# Patient Record
Sex: FEMALE | Race: BLACK OR AFRICAN AMERICAN | NOT HISPANIC OR LATINO | URBAN - METROPOLITAN AREA
[De-identification: names, ages, dates, MRNs, and addresses within clinical notes are randomized per-mention and may not be internally consistent; named-entity substitution may affect disease eponyms.]

---

## 2017-01-10 ENCOUNTER — EMERGENCY (EMERGENCY)
Facility: HOSPITAL | Age: 47
LOS: 1 days | Discharge: HOME | End: 2017-01-10
Attending: EMERGENCY MEDICINE | Admitting: EMERGENCY MEDICINE
Payer: COMMERCIAL

## 2017-01-10 VITALS
DIASTOLIC BLOOD PRESSURE: 100 MMHG | TEMPERATURE: 98 F | OXYGEN SATURATION: 100 % | RESPIRATION RATE: 12 BRPM | SYSTOLIC BLOOD PRESSURE: 139 MMHG | HEART RATE: 132 BPM

## 2017-01-10 VITALS
TEMPERATURE: 98 F | HEART RATE: 89 BPM | RESPIRATION RATE: 17 BRPM | SYSTOLIC BLOOD PRESSURE: 157 MMHG | OXYGEN SATURATION: 98 % | DIASTOLIC BLOOD PRESSURE: 101 MMHG

## 2017-01-10 DIAGNOSIS — N93.9 ABNORMAL UTERINE AND VAGINAL BLEEDING, UNSPECIFIED: ICD-10-CM

## 2017-01-10 DIAGNOSIS — Z98.890 OTHER SPECIFIED POSTPROCEDURAL STATES: Chronic | ICD-10-CM

## 2017-01-10 LAB
ALBUMIN SERPL ELPH-MCNC: 3.9 G/DL — SIGNIFICANT CHANGE UP (ref 3.3–5)
ALP SERPL-CCNC: 68 U/L — SIGNIFICANT CHANGE UP (ref 40–120)
ALT FLD-CCNC: 8 U/L RC — LOW (ref 10–45)
ANION GAP SERPL CALC-SCNC: 13 MMOL/L — SIGNIFICANT CHANGE UP (ref 5–17)
APPEARANCE UR: ABNORMAL
APTT BLD: 25.6 SEC — LOW (ref 27.5–37.4)
AST SERPL-CCNC: 17 U/L — SIGNIFICANT CHANGE UP (ref 10–40)
BACTERIA # UR AUTO: NEGATIVE /HPF — SIGNIFICANT CHANGE UP
BASOPHILS # BLD AUTO: 0 K/UL — SIGNIFICANT CHANGE UP (ref 0–0.2)
BASOPHILS NFR BLD AUTO: 0.4 % — SIGNIFICANT CHANGE UP (ref 0–2)
BILIRUB SERPL-MCNC: 0.5 MG/DL — SIGNIFICANT CHANGE UP (ref 0.2–1.2)
BILIRUB UR-MCNC: NEGATIVE — SIGNIFICANT CHANGE UP
BLD GP AB SCN SERPL QL: NEGATIVE — SIGNIFICANT CHANGE UP
BUN SERPL-MCNC: 7 MG/DL — SIGNIFICANT CHANGE UP (ref 7–23)
CALCIUM SERPL-MCNC: 9.3 MG/DL — SIGNIFICANT CHANGE UP (ref 8.4–10.5)
CHLORIDE SERPL-SCNC: 102 MMOL/L — SIGNIFICANT CHANGE UP (ref 96–108)
CO2 SERPL-SCNC: 24 MMOL/L — SIGNIFICANT CHANGE UP (ref 22–31)
COLOR SPEC: ABNORMAL
CREAT SERPL-MCNC: 0.62 MG/DL — SIGNIFICANT CHANGE UP (ref 0.5–1.3)
DIFF PNL FLD: ABNORMAL
EOSINOPHIL # BLD AUTO: 0.1 K/UL — SIGNIFICANT CHANGE UP (ref 0–0.5)
EOSINOPHIL NFR BLD AUTO: 0.5 % — SIGNIFICANT CHANGE UP (ref 0–6)
EPI CELLS # UR: NEGATIVE /HPF — SIGNIFICANT CHANGE UP
GLUCOSE SERPL-MCNC: 98 MG/DL — SIGNIFICANT CHANGE UP (ref 70–99)
GLUCOSE UR QL: NEGATIVE — SIGNIFICANT CHANGE UP
HCG SERPL-ACNC: <2 MIU/ML — SIGNIFICANT CHANGE UP
HCT VFR BLD CALC: 31 % — LOW (ref 34.5–45)
HCT VFR BLD CALC: 33.7 % — LOW (ref 34.5–45)
HGB BLD-MCNC: 11.3 G/DL — LOW (ref 11.5–15.5)
HGB BLD-MCNC: 12.4 G/DL — SIGNIFICANT CHANGE UP (ref 11.5–15.5)
INR BLD: 1.11 RATIO — SIGNIFICANT CHANGE UP (ref 0.88–1.16)
KETONES UR-MCNC: ABNORMAL
LEUKOCYTE ESTERASE UR-ACNC: ABNORMAL
LIDOCAIN IGE QN: 40 U/L — SIGNIFICANT CHANGE UP (ref 7–60)
LYMPHOCYTES # BLD AUTO: 1.3 K/UL — SIGNIFICANT CHANGE UP (ref 1–3.3)
LYMPHOCYTES # BLD AUTO: 9.8 % — LOW (ref 13–44)
MCHC RBC-ENTMCNC: 30.7 PG — SIGNIFICANT CHANGE UP (ref 27–34)
MCHC RBC-ENTMCNC: 30.7 PG — SIGNIFICANT CHANGE UP (ref 27–34)
MCHC RBC-ENTMCNC: 36.5 GM/DL — HIGH (ref 32–36)
MCHC RBC-ENTMCNC: 36.6 GM/DL — HIGH (ref 32–36)
MCV RBC AUTO: 83.7 FL — SIGNIFICANT CHANGE UP (ref 80–100)
MCV RBC AUTO: 84.1 FL — SIGNIFICANT CHANGE UP (ref 80–100)
MONOCYTES # BLD AUTO: 0.8 K/UL — SIGNIFICANT CHANGE UP (ref 0–0.9)
MONOCYTES NFR BLD AUTO: 6 % — SIGNIFICANT CHANGE UP (ref 2–14)
NEUTROPHILS # BLD AUTO: 11.2 K/UL — HIGH (ref 1.8–7.4)
NEUTROPHILS NFR BLD AUTO: 83.4 % — HIGH (ref 43–77)
NITRITE UR-MCNC: NEGATIVE — SIGNIFICANT CHANGE UP
PH UR: 5.5 — SIGNIFICANT CHANGE UP (ref 4.8–8)
PLATELET # BLD AUTO: 126 K/UL — LOW (ref 150–400)
PLATELET # BLD AUTO: 140 K/UL — LOW (ref 150–400)
POTASSIUM SERPL-MCNC: 3.8 MMOL/L — SIGNIFICANT CHANGE UP (ref 3.5–5.3)
POTASSIUM SERPL-SCNC: 3.8 MMOL/L — SIGNIFICANT CHANGE UP (ref 3.5–5.3)
PROT SERPL-MCNC: 7.3 G/DL — SIGNIFICANT CHANGE UP (ref 6–8.3)
PROT UR-MCNC: 100 MG/DL
PROTHROM AB SERPL-ACNC: 12.1 SEC — SIGNIFICANT CHANGE UP (ref 10–13.1)
RBC # BLD: 3.68 M/UL — LOW (ref 3.8–5.2)
RBC # BLD: 4.03 M/UL — SIGNIFICANT CHANGE UP (ref 3.8–5.2)
RBC # FLD: 14.2 % — SIGNIFICANT CHANGE UP (ref 10.3–14.5)
RBC # FLD: 14.4 % — SIGNIFICANT CHANGE UP (ref 10.3–14.5)
RBC CASTS # UR COMP ASSIST: >50 /HPF (ref 0–2)
RH IG SCN BLD-IMP: POSITIVE — SIGNIFICANT CHANGE UP
SODIUM SERPL-SCNC: 139 MMOL/L — SIGNIFICANT CHANGE UP (ref 135–145)
SP GR SPEC: 1.02 — SIGNIFICANT CHANGE UP (ref 1.01–1.02)
UROBILINOGEN FLD QL: NEGATIVE — SIGNIFICANT CHANGE UP
WBC # BLD: 11.9 K/UL — HIGH (ref 3.8–10.5)
WBC # BLD: 13.4 K/UL — HIGH (ref 3.8–10.5)
WBC # FLD AUTO: 11.9 K/UL — HIGH (ref 3.8–10.5)
WBC # FLD AUTO: 13.4 K/UL — HIGH (ref 3.8–10.5)
WBC UR QL: SIGNIFICANT CHANGE UP /HPF (ref 0–5)

## 2017-01-10 PROCEDURE — 83690 ASSAY OF LIPASE: CPT

## 2017-01-10 PROCEDURE — 86900 BLOOD TYPING SEROLOGIC ABO: CPT

## 2017-01-10 PROCEDURE — 81001 URINALYSIS AUTO W/SCOPE: CPT

## 2017-01-10 PROCEDURE — 86901 BLOOD TYPING SEROLOGIC RH(D): CPT

## 2017-01-10 PROCEDURE — 93005 ELECTROCARDIOGRAM TRACING: CPT

## 2017-01-10 PROCEDURE — 93010 ELECTROCARDIOGRAM REPORT: CPT

## 2017-01-10 PROCEDURE — 99284 EMERGENCY DEPT VISIT MOD MDM: CPT | Mod: 25

## 2017-01-10 PROCEDURE — 85610 PROTHROMBIN TIME: CPT

## 2017-01-10 PROCEDURE — 84702 CHORIONIC GONADOTROPIN TEST: CPT

## 2017-01-10 PROCEDURE — 85730 THROMBOPLASTIN TIME PARTIAL: CPT

## 2017-01-10 PROCEDURE — 86850 RBC ANTIBODY SCREEN: CPT

## 2017-01-10 PROCEDURE — 80053 COMPREHEN METABOLIC PANEL: CPT

## 2017-01-10 PROCEDURE — 85027 COMPLETE CBC AUTOMATED: CPT

## 2017-01-10 RX ORDER — SODIUM CHLORIDE 9 MG/ML
1000 INJECTION INTRAMUSCULAR; INTRAVENOUS; SUBCUTANEOUS ONCE
Qty: 0 | Refills: 0 | Status: COMPLETED | OUTPATIENT
Start: 2017-01-10 | End: 2017-01-10

## 2017-01-10 RX ADMIN — SODIUM CHLORIDE 2000 MILLILITER(S): 9 INJECTION INTRAMUSCULAR; INTRAVENOUS; SUBCUTANEOUS at 11:58

## 2017-01-10 RX ADMIN — SODIUM CHLORIDE 2000 MILLILITER(S): 9 INJECTION INTRAMUSCULAR; INTRAVENOUS; SUBCUTANEOUS at 14:03

## 2017-01-10 NOTE — ED PROVIDER NOTE - PROGRESS NOTE DETAILS
Attending Fabian: I received sign out. pt seen by OB, did not want u/s. will have pt follow up with OB. do not want to start OCP at ths time

## 2017-01-10 NOTE — ED PROVIDER NOTE - ATTENDING CONTRIBUTION TO CARE
menorrhagia with near syncopal episode. WIll give fluids, labs, including cbc and type and screen and will transfuse as needed

## 2017-01-10 NOTE — ED PROVIDER NOTE - MEDICAL DECISION MAKING DETAILS
Resident: menorrhagia with near syncopal episode. WIll give fluids, labs, including cbc and type and screen and will transfuse as needed

## 2018-02-27 PROBLEM — D25.9 LEIOMYOMA OF UTERUS, UNSPECIFIED: Chronic | Status: ACTIVE | Noted: 2017-01-10

## 2018-02-27 PROBLEM — I10 ESSENTIAL (PRIMARY) HYPERTENSION: Chronic | Status: ACTIVE | Noted: 2017-01-10

## 2018-03-20 ENCOUNTER — APPOINTMENT (OUTPATIENT)
Dept: OBGYN | Facility: CLINIC | Age: 48
End: 2018-03-20
Payer: COMMERCIAL

## 2018-03-20 VITALS
DIASTOLIC BLOOD PRESSURE: 80 MMHG | BODY MASS INDEX: 27.48 KG/M2 | SYSTOLIC BLOOD PRESSURE: 120 MMHG | HEIGHT: 66 IN | WEIGHT: 171 LBS

## 2018-03-20 DIAGNOSIS — Z80.42 FAMILY HISTORY OF MALIGNANT NEOPLASM OF PROSTATE: ICD-10-CM

## 2018-03-20 DIAGNOSIS — Z82.49 FAMILY HISTORY OF ISCHEMIC HEART DISEASE AND OTHER DISEASES OF THE CIRCULATORY SYSTEM: ICD-10-CM

## 2018-03-20 DIAGNOSIS — Z01.419 ENCOUNTER FOR GYNECOLOGICAL EXAMINATION (GENERAL) (ROUTINE) W/OUT ABNORMAL FINDINGS: ICD-10-CM

## 2018-03-20 DIAGNOSIS — N92.1 EXCESSIVE AND FREQUENT MENSTRUATION WITH IRREGULAR CYCLE: ICD-10-CM

## 2018-03-20 DIAGNOSIS — R10.2 PELVIC AND PERINEAL PAIN: ICD-10-CM

## 2018-03-20 DIAGNOSIS — N93.0 POSTCOITAL AND CONTACT BLEEDING: ICD-10-CM

## 2018-03-20 LAB
BILIRUB UR QL STRIP: NORMAL
GLUCOSE UR-MCNC: NORMAL
HCG UR QL: 0.2 EU/DL
HGB UR QL STRIP.AUTO: NORMAL
KETONES UR-MCNC: NORMAL
LEUKOCYTE ESTERASE UR QL STRIP: NORMAL
NITRITE UR QL STRIP: NORMAL
PH UR STRIP: 6
PROT UR STRIP-MCNC: NORMAL
SP GR UR STRIP: 1.02

## 2018-03-20 PROCEDURE — 81003 URINALYSIS AUTO W/O SCOPE: CPT | Mod: QW

## 2018-03-20 PROCEDURE — 99386 PREV VISIT NEW AGE 40-64: CPT

## 2018-03-20 RX ORDER — KETOCONAZOLE 20 MG/G
2 CREAM TOPICAL
Qty: 60 | Refills: 0 | Status: DISCONTINUED | COMMUNITY
Start: 2018-02-13

## 2018-03-20 RX ORDER — AMLODIPINE BESYLATE 10 MG/1
10 TABLET ORAL
Qty: 30 | Refills: 0 | Status: ACTIVE | COMMUNITY
Start: 2017-06-22

## 2018-03-22 LAB
BACTERIA UR CULT: NORMAL
HPV HIGH+LOW RISK DNA PNL CVX: NOT DETECTED

## 2018-05-15 ENCOUNTER — APPOINTMENT (OUTPATIENT)
Dept: OBGYN | Facility: CLINIC | Age: 48
End: 2018-05-15

## 2019-03-12 ENCOUNTER — APPOINTMENT (OUTPATIENT)
Dept: CT IMAGING | Facility: IMAGING CENTER | Age: 49
End: 2019-03-12
Payer: COMMERCIAL

## 2019-03-12 ENCOUNTER — OUTPATIENT (OUTPATIENT)
Dept: OUTPATIENT SERVICES | Facility: HOSPITAL | Age: 49
LOS: 1 days | End: 2019-03-12
Payer: COMMERCIAL

## 2019-03-12 DIAGNOSIS — Z98.890 OTHER SPECIFIED POSTPROCEDURAL STATES: Chronic | ICD-10-CM

## 2019-03-12 DIAGNOSIS — Z00.8 ENCOUNTER FOR OTHER GENERAL EXAMINATION: ICD-10-CM

## 2019-03-12 PROCEDURE — 82565 ASSAY OF CREATININE: CPT

## 2019-03-12 PROCEDURE — 74177 CT ABD & PELVIS W/CONTRAST: CPT

## 2019-03-12 PROCEDURE — 74177 CT ABD & PELVIS W/CONTRAST: CPT | Mod: 26

## 2019-03-26 ENCOUNTER — APPOINTMENT (OUTPATIENT)
Dept: CT IMAGING | Facility: IMAGING CENTER | Age: 49
End: 2019-03-26
Payer: COMMERCIAL

## 2019-03-26 ENCOUNTER — OUTPATIENT (OUTPATIENT)
Dept: OUTPATIENT SERVICES | Facility: HOSPITAL | Age: 49
LOS: 1 days | End: 2019-03-26
Payer: COMMERCIAL

## 2019-03-26 DIAGNOSIS — Z00.8 ENCOUNTER FOR OTHER GENERAL EXAMINATION: ICD-10-CM

## 2019-03-26 DIAGNOSIS — Z98.890 OTHER SPECIFIED POSTPROCEDURAL STATES: Chronic | ICD-10-CM

## 2019-03-26 PROCEDURE — 71260 CT THORAX DX C+: CPT | Mod: 26

## 2019-03-26 PROCEDURE — 71260 CT THORAX DX C+: CPT

## 2019-07-12 ENCOUNTER — OUTPATIENT (OUTPATIENT)
Dept: OUTPATIENT SERVICES | Facility: HOSPITAL | Age: 49
LOS: 1 days | End: 2019-07-12
Payer: COMMERCIAL

## 2019-07-12 VITALS
SYSTOLIC BLOOD PRESSURE: 170 MMHG | HEIGHT: 65 IN | TEMPERATURE: 98 F | RESPIRATION RATE: 16 BRPM | DIASTOLIC BLOOD PRESSURE: 110 MMHG | WEIGHT: 175.93 LBS | HEART RATE: 106 BPM

## 2019-07-12 DIAGNOSIS — E88.1 LIPODYSTROPHY, NOT ELSEWHERE CLASSIFIED: ICD-10-CM

## 2019-07-12 DIAGNOSIS — I10 ESSENTIAL (PRIMARY) HYPERTENSION: ICD-10-CM

## 2019-07-12 DIAGNOSIS — Z98.890 OTHER SPECIFIED POSTPROCEDURAL STATES: Chronic | ICD-10-CM

## 2019-07-12 DIAGNOSIS — R58 HEMORRHAGE, NOT ELSEWHERE CLASSIFIED: ICD-10-CM

## 2019-07-12 LAB
ANION GAP SERPL CALC-SCNC: 9 MMO/L — SIGNIFICANT CHANGE UP (ref 7–14)
BLD GP AB SCN SERPL QL: NEGATIVE — SIGNIFICANT CHANGE UP
BUN SERPL-MCNC: 9 MG/DL — SIGNIFICANT CHANGE UP (ref 7–23)
CALCIUM SERPL-MCNC: 9.5 MG/DL — SIGNIFICANT CHANGE UP (ref 8.4–10.5)
CHLORIDE SERPL-SCNC: 100 MMOL/L — SIGNIFICANT CHANGE UP (ref 98–107)
CO2 SERPL-SCNC: 31 MMOL/L — SIGNIFICANT CHANGE UP (ref 22–31)
CREAT SERPL-MCNC: 0.66 MG/DL — SIGNIFICANT CHANGE UP (ref 0.5–1.3)
GLUCOSE SERPL-MCNC: 95 MG/DL — SIGNIFICANT CHANGE UP (ref 70–99)
HCT VFR BLD CALC: 41.6 % — SIGNIFICANT CHANGE UP (ref 34.5–45)
HGB BLD-MCNC: 14.6 G/DL — SIGNIFICANT CHANGE UP (ref 11.5–15.5)
MCHC RBC-ENTMCNC: 27.2 PG — SIGNIFICANT CHANGE UP (ref 27–34)
MCHC RBC-ENTMCNC: 35.1 % — SIGNIFICANT CHANGE UP (ref 32–36)
MCV RBC AUTO: 77.5 FL — LOW (ref 80–100)
NRBC # FLD: 0 K/UL — SIGNIFICANT CHANGE UP (ref 0–0)
PLATELET # BLD AUTO: 226 K/UL — SIGNIFICANT CHANGE UP (ref 150–400)
PMV BLD: 11.5 FL — SIGNIFICANT CHANGE UP (ref 7–13)
POTASSIUM SERPL-MCNC: 3.8 MMOL/L — SIGNIFICANT CHANGE UP (ref 3.5–5.3)
POTASSIUM SERPL-SCNC: 3.8 MMOL/L — SIGNIFICANT CHANGE UP (ref 3.5–5.3)
RBC # BLD: 5.37 M/UL — HIGH (ref 3.8–5.2)
RBC # FLD: 16.3 % — HIGH (ref 10.3–14.5)
RH IG SCN BLD-IMP: POSITIVE — SIGNIFICANT CHANGE UP
SODIUM SERPL-SCNC: 140 MMOL/L — SIGNIFICANT CHANGE UP (ref 135–145)
WBC # BLD: 8.94 K/UL — SIGNIFICANT CHANGE UP (ref 3.8–10.5)
WBC # FLD AUTO: 8.94 K/UL — SIGNIFICANT CHANGE UP (ref 3.8–10.5)

## 2019-07-12 PROCEDURE — 93010 ELECTROCARDIOGRAM REPORT: CPT

## 2019-07-12 RX ORDER — SODIUM CHLORIDE 9 MG/ML
1000 INJECTION, SOLUTION INTRAVENOUS
Refills: 0 | Status: DISCONTINUED | OUTPATIENT
Start: 2019-07-19 | End: 2019-07-19

## 2019-07-12 NOTE — H&P PST ADULT - PRIMARY CARE PROVIDER
pc and cardiologist, , Dr. Perry Frankel [phone] pcp  and cardiologist, , Dr. Perry Frankel [phone]

## 2019-07-12 NOTE — H&P PST ADULT - NSICDXPROBLEM_GEN_ALL_CORE_FT
PROBLEM DIAGNOSES  Problem: Excessive bleeding  Assessment and Plan:     Problem: Hypertension  Assessment and Plan: PROBLEM DIAGNOSES  Problem: Excessive bleeding  Assessment and Plan: This is a 50 y/o female who is scheduled for abdominoplasty, repair of umbilical hernia, total abdominal hysterectomy on 7-19-19  * Given pre op and cleanser instructions with good teach back and patient verbalized understanding      Problem: Hypertension  Assessment and Plan: Await medical evaluation with pcp due to elevated BP at PAST office PROBLEM DIAGNOSES  Problem: Excessive bleeding  Assessment and Plan: This is a 48 y/o female who is scheduled for abdominoplasty, repair of umbilical hernia, total abdominal hysterectomy on 7-19-19  * Given pre op and cleanser instructions with good teach back and patient verbalized understanding      Problem: Hypertension  Assessment and Plan: Await medical evaluation with pcp due to elevated BP at PAST office  * Notified Mary in surgeon's office on 7-15-19

## 2019-07-12 NOTE — H&P PST ADULT - ATTENDING COMMENTS
48 yo with symptomatic fibroid uterus and after all discussion opted for definite treatment by hysterectomy. She is aware of all implications.- Benefits/Risks and associated potential complications were discussed.  Risk of hemorrhage, injury to adjacent organs among other risks and she confirms her comprehension. She verbalized understanding of all explanations and she signed consent. 50 yo with symptomatic fibroid uterus and after all discussion opted for definite treatment by hysterectomy. She is aware of all implications.- Benefits/Risks and associated potential complications were discussed.  Risk of hemorrhage, injury to adjacent organs among other risks and she confirms her comprehension. She verbalized understanding of all explanations and she signed consent.  Abdominoplasty and repair of hernia will be performed by Dr Vallejo

## 2019-07-12 NOTE — H&P PST ADULT - HISTORY OF PRESENT ILLNESS
This is a 48 y/o female who presents with constant vaginal bleeding. Has h/o blood transfusion in 2015 due to excessive menstruation. Visited MD with sonogram and ct scan confirm pathology. Scheduled for abdominoplasty, repair umbilical hernia and DAVIE on 7-19-19

## 2019-07-12 NOTE — H&P PST ADULT - NSICDXPASTMEDICALHX_GEN_ALL_CORE_FT
PAST MEDICAL HISTORY:  Hypertension     Uterine fibroid PAST MEDICAL HISTORY:  Hypertension     Umbilical hernia     Uterine fibroid

## 2019-07-12 NOTE — H&P PST ADULT - NEGATIVE ENMT SYMPTOMS
worsening likely 2/2 diuresis  Renal recommends continuing diuretics stable but elevated BUN/Cr.   Renal recommends continuing diuretics and HD in the future stable but elevated BUN/Cr.   Renal recommends continuing diuretics and HD in the future  started low dose ace inhibitor no hearing difficulty/no nasal congestion

## 2019-07-18 ENCOUNTER — TRANSCRIPTION ENCOUNTER (OUTPATIENT)
Age: 49
End: 2019-07-18

## 2019-07-19 ENCOUNTER — INPATIENT (INPATIENT)
Facility: HOSPITAL | Age: 49
LOS: 4 days | Discharge: HOME | End: 2019-07-24
Attending: OBSTETRICS & GYNECOLOGY | Admitting: OBSTETRICS & GYNECOLOGY
Payer: COMMERCIAL

## 2019-07-19 ENCOUNTER — RESULT REVIEW (OUTPATIENT)
Age: 49
End: 2019-07-19

## 2019-07-19 VITALS
DIASTOLIC BLOOD PRESSURE: 99 MMHG | OXYGEN SATURATION: 98 % | WEIGHT: 175.93 LBS | TEMPERATURE: 98 F | HEART RATE: 113 BPM | SYSTOLIC BLOOD PRESSURE: 141 MMHG | RESPIRATION RATE: 14 BRPM | HEIGHT: 65 IN

## 2019-07-19 DIAGNOSIS — Z98.890 OTHER SPECIFIED POSTPROCEDURAL STATES: Chronic | ICD-10-CM

## 2019-07-19 DIAGNOSIS — E88.1 LIPODYSTROPHY, NOT ELSEWHERE CLASSIFIED: ICD-10-CM

## 2019-07-19 LAB
GLUCOSE BLDC GLUCOMTR-MCNC: 100 MG/DL — HIGH (ref 70–99)
HCG UR QL: NEGATIVE — SIGNIFICANT CHANGE UP
HCT VFR BLD CALC: 38.2 % — SIGNIFICANT CHANGE UP (ref 34.5–45)
HGB BLD-MCNC: 13.5 G/DL — SIGNIFICANT CHANGE UP (ref 11.5–15.5)
MCHC RBC-ENTMCNC: 27.2 PG — SIGNIFICANT CHANGE UP (ref 27–34)
MCHC RBC-ENTMCNC: 35.3 % — SIGNIFICANT CHANGE UP (ref 32–36)
MCV RBC AUTO: 76.9 FL — LOW (ref 80–100)
NRBC # FLD: 0 K/UL — SIGNIFICANT CHANGE UP (ref 0–0)
PLATELET # BLD AUTO: 212 K/UL — SIGNIFICANT CHANGE UP (ref 150–400)
PMV BLD: 11.3 FL — SIGNIFICANT CHANGE UP (ref 7–13)
RBC # BLD: 4.97 M/UL — SIGNIFICANT CHANGE UP (ref 3.8–5.2)
RBC # FLD: 15.9 % — HIGH (ref 10.3–14.5)
RH IG SCN BLD-IMP: POSITIVE — SIGNIFICANT CHANGE UP
WBC # BLD: 18.46 K/UL — HIGH (ref 3.8–10.5)
WBC # FLD AUTO: 18.46 K/UL — HIGH (ref 3.8–10.5)

## 2019-07-19 PROCEDURE — 88302 TISSUE EXAM BY PATHOLOGIST: CPT | Mod: 26

## 2019-07-19 PROCEDURE — 88307 TISSUE EXAM BY PATHOLOGIST: CPT | Mod: 26

## 2019-07-19 RX ORDER — SODIUM CHLORIDE 9 MG/ML
1000 INJECTION, SOLUTION INTRAVENOUS
Refills: 0 | Status: DISCONTINUED | OUTPATIENT
Start: 2019-07-19 | End: 2019-07-21

## 2019-07-19 RX ORDER — KETOROLAC TROMETHAMINE 30 MG/ML
30 SYRINGE (ML) INJECTION EVERY 6 HOURS
Refills: 0 | Status: DISCONTINUED | OUTPATIENT
Start: 2019-07-19 | End: 2019-07-20

## 2019-07-19 RX ORDER — METOPROLOL TARTRATE 50 MG
5 TABLET ORAL EVERY 6 HOURS
Refills: 0 | Status: DISCONTINUED | OUTPATIENT
Start: 2019-07-19 | End: 2019-07-19

## 2019-07-19 RX ORDER — SODIUM CHLORIDE 9 MG/ML
1000 INJECTION, SOLUTION INTRAVENOUS
Refills: 0 | Status: DISCONTINUED | OUTPATIENT
Start: 2019-07-19 | End: 2019-07-20

## 2019-07-19 RX ORDER — AMLODIPINE BESYLATE 2.5 MG/1
10 TABLET ORAL DAILY
Refills: 0 | Status: DISCONTINUED | OUTPATIENT
Start: 2019-07-20 | End: 2019-07-24

## 2019-07-19 RX ORDER — SENNA PLUS 8.6 MG/1
2 TABLET ORAL AT BEDTIME
Refills: 0 | Status: DISCONTINUED | OUTPATIENT
Start: 2019-07-19 | End: 2019-07-24

## 2019-07-19 RX ORDER — METOPROLOL TARTRATE 50 MG
5 TABLET ORAL EVERY 6 HOURS
Refills: 0 | Status: COMPLETED | OUTPATIENT
Start: 2019-07-19 | End: 2019-07-20

## 2019-07-19 RX ORDER — HYDROMORPHONE HYDROCHLORIDE 2 MG/ML
0.5 INJECTION INTRAMUSCULAR; INTRAVENOUS; SUBCUTANEOUS
Refills: 0 | Status: DISCONTINUED | OUTPATIENT
Start: 2019-07-19 | End: 2019-07-20

## 2019-07-19 RX ORDER — HYDROMORPHONE HYDROCHLORIDE 2 MG/ML
30 INJECTION INTRAMUSCULAR; INTRAVENOUS; SUBCUTANEOUS
Refills: 0 | Status: DISCONTINUED | OUTPATIENT
Start: 2019-07-19 | End: 2019-07-20

## 2019-07-19 RX ORDER — HEPARIN SODIUM 5000 [USP'U]/ML
5000 INJECTION INTRAVENOUS; SUBCUTANEOUS EVERY 12 HOURS
Refills: 0 | Status: DISCONTINUED | OUTPATIENT
Start: 2019-07-19 | End: 2019-07-24

## 2019-07-19 RX ORDER — ONDANSETRON 8 MG/1
4 TABLET, FILM COATED ORAL ONCE
Refills: 0 | Status: DISCONTINUED | OUTPATIENT
Start: 2019-07-19 | End: 2019-07-20

## 2019-07-19 RX ORDER — NALOXONE HYDROCHLORIDE 4 MG/.1ML
0.1 SPRAY NASAL
Refills: 0 | Status: DISCONTINUED | OUTPATIENT
Start: 2019-07-19 | End: 2019-07-20

## 2019-07-19 RX ORDER — HYDROMORPHONE HYDROCHLORIDE 2 MG/ML
1 INJECTION INTRAMUSCULAR; INTRAVENOUS; SUBCUTANEOUS
Refills: 0 | Status: DISCONTINUED | OUTPATIENT
Start: 2019-07-19 | End: 2019-07-20

## 2019-07-19 RX ORDER — ONDANSETRON 8 MG/1
4 TABLET, FILM COATED ORAL EVERY 6 HOURS
Refills: 0 | Status: DISCONTINUED | OUTPATIENT
Start: 2019-07-19 | End: 2019-07-20

## 2019-07-19 RX ORDER — ERTAPENEM SODIUM 1 G/1
1000 INJECTION, POWDER, LYOPHILIZED, FOR SOLUTION INTRAMUSCULAR; INTRAVENOUS ONCE
Refills: 0 | Status: COMPLETED | OUTPATIENT
Start: 2019-07-19 | End: 2019-07-20

## 2019-07-19 RX ORDER — DOCUSATE SODIUM 100 MG
100 CAPSULE ORAL THREE TIMES A DAY
Refills: 0 | Status: DISCONTINUED | OUTPATIENT
Start: 2019-07-19 | End: 2019-07-24

## 2019-07-19 RX ADMIN — HYDROMORPHONE HYDROCHLORIDE 30 MILLILITER(S): 2 INJECTION INTRAMUSCULAR; INTRAVENOUS; SUBCUTANEOUS at 22:19

## 2019-07-19 RX ADMIN — Medication 30 MILLIGRAM(S): at 21:45

## 2019-07-19 RX ADMIN — Medication 30 MILLIGRAM(S): at 22:00

## 2019-07-19 RX ADMIN — SODIUM CHLORIDE 125 MILLILITER(S): 9 INJECTION, SOLUTION INTRAVENOUS at 20:00

## 2019-07-19 NOTE — BRIEF OPERATIVE NOTE - NSICDXBRIEFPOSTOP_GEN_ALL_CORE_FT
POST-OP DIAGNOSIS:  Abnormal uterine bleeding 19-Jul-2019 18:36:30  Janine Munoz  Fibroids 19-Jul-2019 18:36:22  Janine Munoz
POST-OP DIAGNOSIS:  Fibroids 19-Jul-2019 18:36:22  Janine Munoz

## 2019-07-19 NOTE — BRIEF OPERATIVE NOTE - ANTIBIOTIC PROTOCOL
Ancef preop, invanz administered postoperatively due to length of procedure and entry into the vagina
Followed protocol

## 2019-07-19 NOTE — BRIEF OPERATIVE NOTE - OPERATION/FINDINGS
Fibroid uterus, 18 week size, normal appearing fallopian tubes and ovaries, normal appearing bowel and appendix, adhesions noted in the left upper abdomen between the bowel and the anterior abdominal wall, ureters visualized bilaterally. Fibroid uterus, 18 week size, normal appearing fallopian tubes and ovaries, nodular uterus. Myomas subserosal, intramural and subserosal noted  normal appearing bowel and appendix, adhesions noted in the left upper abdomen between the bowel and the anterior abdominal wall, ureters visualized bilaterally.

## 2019-07-19 NOTE — BRIEF OPERATIVE NOTE - NSICDXBRIEFPREOP_GEN_ALL_CORE_FT
PRE-OP DIAGNOSIS:  Abnormal uterine bleeding 19-Jul-2019 18:36:14  Janine Munoz  Fibroids 19-Jul-2019 18:36:04  Janine Munoz
PRE-OP DIAGNOSIS:  Fibroids 19-Jul-2019 18:36:04  Janine Munoz

## 2019-07-19 NOTE — ASU PREOP CHECKLIST - BP NONINVASIVE SYSTOLIC (MM HG)
pt c/o midsternal chest pain since friday worse with inspiration, now with vomiting, sweating and subjective fevers. denies any other symptoms. pt appears uncomfortable in triage PMHX: htn 141

## 2019-07-19 NOTE — BRIEF OPERATIVE NOTE - NSICDXBRIEFPROCEDURE_GEN_ALL_CORE_FT
PROCEDURES:  Total abdominal hysterectomy with bilateral salpingectomy 19-Jul-2019 18:34:38  Janine Munoz
PROCEDURES:  Abdominoplasty 19-Jul-2019 20:16:51  Pat Alcazar E

## 2019-07-20 DIAGNOSIS — Z98.890 OTHER SPECIFIED POSTPROCEDURAL STATES: ICD-10-CM

## 2019-07-20 LAB
ALBUMIN SERPL ELPH-MCNC: 3.3 G/DL — SIGNIFICANT CHANGE UP (ref 3.3–5)
ALP SERPL-CCNC: 77 U/L — SIGNIFICANT CHANGE UP (ref 40–120)
ALT FLD-CCNC: 11 U/L — SIGNIFICANT CHANGE UP (ref 4–33)
ANION GAP SERPL CALC-SCNC: 10 MMO/L — SIGNIFICANT CHANGE UP (ref 7–14)
AST SERPL-CCNC: 20 U/L — SIGNIFICANT CHANGE UP (ref 4–32)
BILIRUB SERPL-MCNC: 0.8 MG/DL — SIGNIFICANT CHANGE UP (ref 0.2–1.2)
BUN SERPL-MCNC: 6 MG/DL — LOW (ref 7–23)
CALCIUM SERPL-MCNC: 8.8 MG/DL — SIGNIFICANT CHANGE UP (ref 8.4–10.5)
CHLORIDE SERPL-SCNC: 97 MMOL/L — LOW (ref 98–107)
CO2 SERPL-SCNC: 27 MMOL/L — SIGNIFICANT CHANGE UP (ref 22–31)
CREAT SERPL-MCNC: 0.53 MG/DL — SIGNIFICANT CHANGE UP (ref 0.5–1.3)
GLUCOSE SERPL-MCNC: 119 MG/DL — HIGH (ref 70–99)
HCT VFR BLD CALC: 36.5 % — SIGNIFICANT CHANGE UP (ref 34.5–45)
HGB BLD-MCNC: 12.9 G/DL — SIGNIFICANT CHANGE UP (ref 11.5–15.5)
MCHC RBC-ENTMCNC: 27.2 PG — SIGNIFICANT CHANGE UP (ref 27–34)
MCHC RBC-ENTMCNC: 35.3 % — SIGNIFICANT CHANGE UP (ref 32–36)
MCV RBC AUTO: 77 FL — LOW (ref 80–100)
NRBC # FLD: 0 K/UL — SIGNIFICANT CHANGE UP (ref 0–0)
PLATELET # BLD AUTO: 201 K/UL — SIGNIFICANT CHANGE UP (ref 150–400)
PMV BLD: 11.7 FL — SIGNIFICANT CHANGE UP (ref 7–13)
POTASSIUM SERPL-MCNC: 3.7 MMOL/L — SIGNIFICANT CHANGE UP (ref 3.5–5.3)
POTASSIUM SERPL-SCNC: 3.7 MMOL/L — SIGNIFICANT CHANGE UP (ref 3.5–5.3)
PROT SERPL-MCNC: 6.8 G/DL — SIGNIFICANT CHANGE UP (ref 6–8.3)
RBC # BLD: 4.74 M/UL — SIGNIFICANT CHANGE UP (ref 3.8–5.2)
RBC # FLD: 15.9 % — HIGH (ref 10.3–14.5)
SODIUM SERPL-SCNC: 134 MMOL/L — LOW (ref 135–145)
WBC # BLD: 15.39 K/UL — HIGH (ref 3.8–10.5)
WBC # FLD AUTO: 15.39 K/UL — HIGH (ref 3.8–10.5)

## 2019-07-20 RX ORDER — IBUPROFEN 200 MG
600 TABLET ORAL EVERY 6 HOURS
Refills: 0 | Status: DISCONTINUED | OUTPATIENT
Start: 2019-07-20 | End: 2019-07-24

## 2019-07-20 RX ORDER — HYDROMORPHONE HYDROCHLORIDE 2 MG/ML
2 INJECTION INTRAMUSCULAR; INTRAVENOUS; SUBCUTANEOUS EVERY 4 HOURS
Refills: 0 | Status: DISCONTINUED | OUTPATIENT
Start: 2019-07-20 | End: 2019-07-24

## 2019-07-20 RX ADMIN — AMLODIPINE BESYLATE 10 MILLIGRAM(S): 2.5 TABLET ORAL at 05:13

## 2019-07-20 RX ADMIN — Medication 600 MILLIGRAM(S): at 23:34

## 2019-07-20 RX ADMIN — HEPARIN SODIUM 5000 UNIT(S): 5000 INJECTION INTRAVENOUS; SUBCUTANEOUS at 05:13

## 2019-07-20 RX ADMIN — HYDROMORPHONE HYDROCHLORIDE 30 MILLILITER(S): 2 INJECTION INTRAMUSCULAR; INTRAVENOUS; SUBCUTANEOUS at 07:41

## 2019-07-20 RX ADMIN — Medication 600 MILLIGRAM(S): at 18:03

## 2019-07-20 RX ADMIN — ERTAPENEM SODIUM 120 MILLIGRAM(S): 1 INJECTION, POWDER, LYOPHILIZED, FOR SOLUTION INTRAMUSCULAR; INTRAVENOUS at 00:48

## 2019-07-20 RX ADMIN — HEPARIN SODIUM 5000 UNIT(S): 5000 INJECTION INTRAVENOUS; SUBCUTANEOUS at 18:02

## 2019-07-20 RX ADMIN — Medication 5 MILLIGRAM(S): at 05:13

## 2019-07-20 RX ADMIN — Medication 600 MILLIGRAM(S): at 18:30

## 2019-07-20 RX ADMIN — HYDROMORPHONE HYDROCHLORIDE 2 MILLIGRAM(S): 2 INJECTION INTRAMUSCULAR; INTRAVENOUS; SUBCUTANEOUS at 16:20

## 2019-07-20 RX ADMIN — SODIUM CHLORIDE 75 MILLILITER(S): 9 INJECTION, SOLUTION INTRAVENOUS at 16:20

## 2019-07-20 RX ADMIN — HYDROMORPHONE HYDROCHLORIDE 2 MILLIGRAM(S): 2 INJECTION INTRAMUSCULAR; INTRAVENOUS; SUBCUTANEOUS at 23:34

## 2019-07-20 RX ADMIN — HYDROMORPHONE HYDROCHLORIDE 2 MILLIGRAM(S): 2 INJECTION INTRAMUSCULAR; INTRAVENOUS; SUBCUTANEOUS at 16:50

## 2019-07-20 RX ADMIN — SODIUM CHLORIDE 125 MILLILITER(S): 9 INJECTION, SOLUTION INTRAVENOUS at 00:22

## 2019-07-20 RX ADMIN — SODIUM CHLORIDE 75 MILLILITER(S): 9 INJECTION, SOLUTION INTRAVENOUS at 18:05

## 2019-07-20 RX ADMIN — SENNA PLUS 2 TABLET(S): 8.6 TABLET ORAL at 05:14

## 2019-07-20 RX ADMIN — SODIUM CHLORIDE 125 MILLILITER(S): 9 INJECTION, SOLUTION INTRAVENOUS at 07:42

## 2019-07-20 RX ADMIN — HYDROMORPHONE HYDROCHLORIDE 30 MILLILITER(S): 2 INJECTION INTRAMUSCULAR; INTRAVENOUS; SUBCUTANEOUS at 00:21

## 2019-07-20 NOTE — PROGRESS NOTE ADULT - SUBJECTIVE AND OBJECTIVE BOX
Plastic Surgery Progress Note (pg [facility]: [id number], NS: [phone])    SUBJECTIVE:  The patient was seen and examined this morning during rounds. No acute events overnight.    OBJECTIVE:     ** VITAL SIGNS / I&O's **    Vital Signs Last 24 Hrs  T(C): 37 (20 Jul 2019 10:17), Max: 37 (20 Jul 2019 00:00)  T(F): 98.6 (20 Jul 2019 10:17), Max: 98.6 (20 Jul 2019 00:00)  HR: 101 (20 Jul 2019 10:17) (87 - 115)  BP: 131/85 (20 Jul 2019 10:17) (114/80 - 144/94)  BP(mean): 93 (19 Jul 2019 23:00) (93 - 104)  RR: 18 (20 Jul 2019 10:17) (18 - 28)  SpO2: 99% (20 Jul 2019 10:17) (95% - 100%)      19 Jul 2019 07:01  -  20 Jul 2019 07:00  --------------------------------------------------------  IN:    IV PiggyBack: 50 mL    lactated ringers.: 375 mL    lactated ringers.: 875 mL    Oral Fluid: 60 mL  Total IN: 1360 mL    OUT:    Bulb: 25 mL    Bulb: 4 mL    Indwelling Catheter - Urethral: 1780 mL  Total OUT: 1809 mL    Total NET: -449 mL      20 Jul 2019 07:01  -  20 Jul 2019 14:05  --------------------------------------------------------  IN:  Total IN: 0 mL    OUT:    Indwelling Catheter - Urethral: 150 mL  Total OUT: 150 mL    Total NET: -150 mL          ** PHYSICAL EXAM **    -- CONSTITUTIONAL: Alert, NAD   -- ABDOMEN: soft, appropriately tender, incision c/d/i no collections appreciated, JPs serosang    ** LABS **                          12.9   15.39 )-----------( 201      ( 20 Jul 2019 06:00 )             36.5     20 Jul 2019 06:00    134    |  97     |  6      ----------------------------<  119    3.7     |  27     |  0.53     Ca    8.8        20 Jul 2019 06:00    TPro  6.8    /  Alb  3.3    /  TBili  0.8    /  DBili  x      /  AST  20     /  ALT  11     /  AlkPhos  77     20 Jul 2019 06:00      ** MEDICATIONS **  MEDICATIONS  (STANDING):  amLODIPine   Tablet 10 milliGRAM(s) Oral daily  heparin  Injectable 5000 Unit(s) SubCutaneous every 12 hours  lactated ringers. 1000 milliLiter(s) (125 mL/Hr) IV Continuous <Continuous>    MEDICATIONS  (PRN):  docusate sodium 100 milliGRAM(s) Oral three times a day PRN Stool Softening  HYDROmorphone   Tablet 2 milliGRAM(s) Oral every 4 hours PRN Severe Pain (7 - 10)  ketorolac   Injectable 30 milliGRAM(s) IV Push every 6 hours PRN Moderate Pain (4 - 6)  senna 2 Tablet(s) Oral at bedtime PRN Constipation

## 2019-07-20 NOTE — PROGRESS NOTE ADULT - SUBJECTIVE AND OBJECTIVE BOX
Anesthesia Pain Management Service    SUBJECTIVE: Patient is doing well with IV PCA and no significant problems reported.    Pain Scale Score	At rest: ___ 	With Activity: ___ 	[X ] Refer to charted pain scores    THERAPY:    [ ] IV PCA Morphine		[ ] 5 mg/mL	[ ] 1 mg/mL  [X ] IV PCA Hydromorphone	[ ] 5 mg/mL	[X ] 1 mg/mL  [ ] IV PCA Fentanyl		[ ] 50 micrograms/mL    Demand dose __0.2_ lockout __6_ (minutes) Continuous Rate _0__ Total:3.8 ___  Daily      MEDICATIONS  (STANDING):  amLODIPine   Tablet 10 milliGRAM(s) Oral daily  heparin  Injectable 5000 Unit(s) SubCutaneous every 12 hours  HYDROmorphone PCA (1 mG/mL) 30 milliLiter(s) PCA Continuous PCA Continuous  lactated ringers. 1000 milliLiter(s) (125 mL/Hr) IV Continuous <Continuous>    MEDICATIONS  (PRN):  docusate sodium 100 milliGRAM(s) Oral three times a day PRN Stool Softening  HYDROmorphone PCA (1 mG/mL) Rescue Clinician Bolus 0.5 milliGRAM(s) IV Push every 15 minutes PRN for Pain Scale GREATER THAN 6  ketorolac   Injectable 30 milliGRAM(s) IV Push every 6 hours PRN Moderate Pain (4 - 6)  naloxone Injectable 0.1 milliGRAM(s) IV Push every 3 minutes PRN For ANY of the following changes in patient status:  A. RR LESS THAN 10 breaths per minute, B. Oxygen saturation LESS THAN 90%, C. Sedation score of 6  ondansetron Injectable 4 milliGRAM(s) IV Push every 6 hours PRN Nausea  senna 2 Tablet(s) Oral at bedtime PRN Constipation      OBJECTIVE:    Sedation Score:	[ X] Alert	[ ] Drowsy 	[ ] Arousable	[ ] Asleep	[ ] Unresponsive    Side Effects:	[X ] None	[ ] Nausea	[ ] Vomiting	[ ] Pruritus  		[ ] Other:    Vital Signs Last 24 Hrs  T(C): 36.9 (20 Jul 2019 05:10), Max: 37 (20 Jul 2019 00:00)  T(F): 98.4 (20 Jul 2019 05:10), Max: 98.6 (20 Jul 2019 00:00)  HR: 87 (20 Jul 2019 05:30) (87 - 115)  BP: 114/80 (20 Jul 2019 05:30) (114/80 - 144/94)  BP(mean): 93 (19 Jul 2019 23:00) (93 - 104)  RR: 21 (20 Jul 2019 05:10) (14 - 28)  SpO2: 98% (20 Jul 2019 05:10) (95% - 100%)    ASSESSMENT/ PLAN    Therapy to  be:	[ X] Continue   [ ] Discontinued   [ ] Change to prn Analgesics    Documentation and Verification of current medications:   [X] Done	[ ] Not done, not elligible    Comments:

## 2019-07-20 NOTE — PROGRESS NOTE ADULT - PROBLEM SELECTOR PLAN 1
Neuro: d/c PCA today transition to PO pain meds  CV: Hemodynamically stable. Tachycardia resolved. WIll restart home BP meds today.  Pulm: Saturating well on room air, encourage oob/amb  GI: Continue regular diet  : UOP adequate, d/c bustos  Heme: c/w HSQ and SCDs for DVT ppx  FEN: LR@125.   ID: Afebrile  Endo: No active issues   Dispo: Continue routine post-op care    Fercho Prabhakar PGY-2

## 2019-07-20 NOTE — PROGRESS NOTE ADULT - SUBJECTIVE AND OBJECTIVE BOX
Pt seen and examined at bedside. Pt states mild abdominal pain. Pt  ambulating, tolerating diet, no  flatus,  no BM.   Pt denies fever, chills, chest pain, SOB, nausea, vomiting, lightheadedness, dizziness.      T(F): 98.6 (07-20-19 @ 10:17), Max: 98.6 (07-20-19 @ 00:00)  HR: 101 (07-20-19 @ 10:17) (87 - 115)  BP: 131/85 (07-20-19 @ 10:17) (114/80 - 144/94)  RR: 18 (07-20-19 @ 10:17) (18 - 28)  SpO2: 99% (07-20-19 @ 10:17) (95% - 100%)  Wt(kg): --  I&O's Summary    19 Jul 2019 07:01  -  20 Jul 2019 07:00  --------------------------------------------------------  IN: 1360 mL / OUT: 1809 mL / NET: -449 mL    20 Jul 2019 07:01  -  20 Jul 2019 12:21  --------------------------------------------------------  IN: 0 mL / OUT: 150 mL / NET: -150 mL        MEDICATIONS  (STANDING):  amLODIPine   Tablet 10 milliGRAM(s) Oral daily  heparin  Injectable 5000 Unit(s) SubCutaneous every 12 hours  HYDROmorphone PCA (1 mG/mL) 30 milliLiter(s) PCA Continuous PCA Continuous  lactated ringers. 1000 milliLiter(s) (125 mL/Hr) IV Continuous <Continuous>    MEDICATIONS  (PRN):  docusate sodium 100 milliGRAM(s) Oral three times a day PRN Stool Softening  HYDROmorphone PCA (1 mG/mL) Rescue Clinician Bolus 0.5 milliGRAM(s) IV Push every 15 minutes PRN for Pain Scale GREATER THAN 6  ketorolac   Injectable 30 milliGRAM(s) IV Push every 6 hours PRN Moderate Pain (4 - 6)  naloxone Injectable 0.1 milliGRAM(s) IV Push every 3 minutes PRN For ANY of the following changes in patient status:  A. RR LESS THAN 10 breaths per minute, B. Oxygen saturation LESS THAN 90%, C. Sedation score of 6  ondansetron Injectable 4 milliGRAM(s) IV Push every 6 hours PRN Nausea  senna 2 Tablet(s) Oral at bedtime PRN Constipation      Physical Exam:  Constitutional: NAD  Pulmonary: clear to auscultation bilaterally   Cardiovascular: Regular rate and rhythm   Abdomen: incision site clean, dry, intact. Soft, mildly tender, [] distended, no guarding, no rebound, [] bowel sounds  Extremities: no lower extremity edema or  tenderness. SCDs in place     LABS:                        12.9   15.39 )-----------( 201      ( 20 Jul 2019 06:00 )             36.5     07-20    134<L>  |  97<L>  |  6<L>  ----------------------------<  119<H>  3.7   |  27  |  0.53    Ca    8.8      20 Jul 2019 06:00    TPro  6.8  /  Alb  3.3  /  TBili  0.8  /  DBili  x   /  AST  20  /  ALT  11  /  AlkPhos  77  07-20          RADIOLOGY & ADDITIONAL TESTS:

## 2019-07-20 NOTE — PROGRESS NOTE ADULT - ASSESSMENT
50 y/o F s/p DAVIE, umbilical hernia repair, abdominoplasty on 7/19 recovering well  - continue abdominal binder  - continue KATERYNA drains  - keep bed flexed  - may ambulate flexed slightly at waist  - dvt ppx  - pain control  - dispo per gyn    Plastic Surgery (pg [facility]: [id number], NS: [phone])

## 2019-07-20 NOTE — CHART NOTE - NSCHARTNOTEFT_GEN_A_CORE
R2 GYN POST-OP CHECK    Patient seen and evaluated at bedside.    Pt sleeping, easily arousable  Patient reports pain controlled with analgesia.   Pt denies N/V, SOB, CP, palpitations, fever/chills.   Tolerating clears.    Not OOB yet.    O:   T(C): 36.8 (07-19-19 @ 23:00), Max: 36.8 (07-19-19 @ 23:00)  HR: 100 (07-19-19 @ 23:00) (100 - 115)  BP: 126/83 (07-19-19 @ 23:00) (126/83 - 144/94)  RR: 22 (07-19-19 @ 23:00) (21 - 28)  SpO2: 97% (07-19-19 @ 23:00) (96% - 100%)  Wt(kg): --  I&O's Summary    19 Jul 2019 07:01  -  20 Jul 2019 00:52  --------------------------------------------------------  IN: 435 mL / OUT: 1180 mL / NET: -745 mL        Gen: Resting comfortably in bed, NAD  CV: S1S2, RRR  Lungs: CTA B/L  Abd: soft, appropriately tender, occasional BS x 4 quadrants.    Inc: Clean/dry/intact w/ bandage in place  Ext: SCD's in place and functional, non-tender b/l, no edema    amLODIPine   Tablet 10 milliGRAM(s) Oral daily  docusate sodium 100 milliGRAM(s) Oral three times a day PRN  heparin  Injectable 5000 Unit(s) SubCutaneous every 12 hours  HYDROmorphone PCA (1 mG/mL) 30 milliLiter(s) PCA Continuous PCA Continuous  HYDROmorphone PCA (1 mG/mL) Rescue Clinician Bolus 0.5 milliGRAM(s) IV Push every 15 minutes PRN  ketorolac   Injectable 30 milliGRAM(s) IV Push every 6 hours PRN  lactated ringers. 1000 milliLiter(s) IV Continuous <Continuous>  metoprolol tartrate Injectable 5 milliGRAM(s) IV Push every 6 hours  naloxone Injectable 0.1 milliGRAM(s) IV Push every 3 minutes PRN  ondansetron Injectable 4 milliGRAM(s) IV Push every 6 hours PRN  senna 2 Tablet(s) Oral at bedtime PRN      A/P: 49y Female s/p Total Abdominal Hysterectomy, Bilateral Salpingectomy, abdominoplasty, and repair of umbilical hernia meeting appropriate postop milestones.    Neuro: PCA, toradol for pain control  CV: Hemodynamically stable. 11pm CBC downtrended appropriately. Monitor VS. CBC in AM.   Pulm: Saturating well on room air.  Encourage OOB and incentive spirometer use.   GI: Advance to regular diet. Anti-emetics PRN.  : Mock to gravity.   FEN: Electrolytes: LR@125cc/hr.  Heme: DVT ppx w/ SCD's while in bed. Early ambulation, initially with assistance then as tolerated. HSQ   ID: Afebrile  Endo: No active issues   Dispo: Continue routine postop care    Hanh Low MD PGY2  Pager #[phone]

## 2019-07-20 NOTE — PROGRESS NOTE ADULT - ASSESSMENT
A/P: 49y POD# 1  s/p DAVIE, BS, abdominoplasty, repair of umbilical hernia. Patient was tachycardic overnight but tachycardia resolved. Mock in place with good UOP. Will restart BP meds today. Patient is stable and doing well.

## 2019-07-20 NOTE — PROGRESS NOTE ADULT - ASSESSMENT
S/P DAVIE bs Abdominoplasty   stable appropiate labs hg and cmp   states no pain   sleepy with PCA- plan d/c PCA and change to po analgesia   Incision dressing dry and intact  Extremities- with pneumatic teds    I explained all findings of surgery and answered all her questions   Her  at bedside   advice ambulation and its importance discussed. To ambulate s/p pca d/c and feels less drowsy

## 2019-07-20 NOTE — PROGRESS NOTE ADULT - SUBJECTIVE AND OBJECTIVE BOX
R2 GYN Progress Note    POD#1   HD#2    Patient seen and examined at bedside.  No acute events overnight. No acute complaints.  Pain well controlled.  Patient is ambulating and tolerating regular diet  Has not yet passed flatus   Bustos is still in place.   Denies CP, SOB, N/V, fevers, and chills.    Vital Signs Last 24 Hours  T(C): 36.9 (07-20-19 @ 05:10), Max: 37 (07-20-19 @ 00:00)  HR: 87 (07-20-19 @ 05:30) (87 - 115)  BP: 114/80 (07-20-19 @ 05:30) (114/80 - 144/94)  RR: 21 (07-20-19 @ 05:10) (14 - 28)  SpO2: 98% (07-20-19 @ 05:10) (95% - 100%)    I&O's Summary    19 Jul 2019 07:01  -  20 Jul 2019 07:00  --------------------------------------------------------  IN: 1360 mL / OUT: 1809 mL / NET: -449 mL        Physical Exam:  General: NAD  CV: RR, S1, S2, no M/R/G  Lungs: CTA b/l, good air flow b/l   Abdomen: Soft, appropriately-tender, softly distended, tympanic, normoactive bowel sounds  Incision: Low transverse incision CDI  : no bleeding on pad, bustos in place  Ext: No pain or swelling     Labs:                        12.9   15.39 )-----------( 201      ( 20 Jul 2019 06:00 )             36.5   baso x      eos x      imm gran x      lymph x      mono x      poly x                            13.5   18.46 )-----------( 212      ( 19 Jul 2019 22:55 )             38.2   baso x      eos x      imm gran x      lymph x      mono x      poly x          MEDICATIONS  (STANDING):  amLODIPine   Tablet 10 milliGRAM(s) Oral daily  heparin  Injectable 5000 Unit(s) SubCutaneous every 12 hours  HYDROmorphone PCA (1 mG/mL) 30 milliLiter(s) PCA Continuous PCA Continuous  lactated ringers. 1000 milliLiter(s) (125 mL/Hr) IV Continuous <Continuous>    MEDICATIONS  (PRN):  docusate sodium 100 milliGRAM(s) Oral three times a day PRN Stool Softening  HYDROmorphone PCA (1 mG/mL) Rescue Clinician Bolus 0.5 milliGRAM(s) IV Push every 15 minutes PRN for Pain Scale GREATER THAN 6  ketorolac   Injectable 30 milliGRAM(s) IV Push every 6 hours PRN Moderate Pain (4 - 6)  naloxone Injectable 0.1 milliGRAM(s) IV Push every 3 minutes PRN For ANY of the following changes in patient status:  A. RR LESS THAN 10 breaths per minute, B. Oxygen saturation LESS THAN 90%, C. Sedation score of 6  ondansetron Injectable 4 milliGRAM(s) IV Push every 6 hours PRN Nausea  senna 2 Tablet(s) Oral at bedtime PRN Constipation

## 2019-07-20 NOTE — PROGRESS NOTE ADULT - SUBJECTIVE AND OBJECTIVE BOX
Attending Plastic Surgery    Pt has been OOB ambulating, voiding.    Incision clean and dry. Drains serosanguinous.   Printed instructions and drain recording sheet demonstrated to patient and family.    Doing well,  Dr. Fernando Vallejo

## 2019-07-20 NOTE — PROGRESS NOTE ADULT - SUBJECTIVE AND OBJECTIVE BOX
ANESTHESIA POSTOP CHECK    49y Female POSTOP DAY 1 S/P     Vital Signs Last 24 Hrs  T(C): 36.9 (20 Jul 2019 05:10), Max: 37 (20 Jul 2019 00:00)  T(F): 98.4 (20 Jul 2019 05:10), Max: 98.6 (20 Jul 2019 00:00)  HR: 87 (20 Jul 2019 05:30) (87 - 115)  BP: 114/80 (20 Jul 2019 05:30) (114/80 - 144/94)  BP(mean): 93 (19 Jul 2019 23:00) (93 - 104)  RR: 21 (20 Jul 2019 05:10) (14 - 28)  SpO2: 98% (20 Jul 2019 05:10) (95% - 100%)  I&O's Summary    19 Jul 2019 07:01  -  20 Jul 2019 07:00  --------------------------------------------------------  IN: 1360 mL / OUT: 1809 mL / NET: -449 mL        [X ] NO APPARENT ANESTHESIA COMPLICATIONS      Comments:

## 2019-07-21 ENCOUNTER — TRANSCRIPTION ENCOUNTER (OUTPATIENT)
Age: 49
End: 2019-07-21

## 2019-07-21 RX ORDER — IBUPROFEN 200 MG
1 TABLET ORAL
Qty: 0 | Refills: 0 | DISCHARGE
Start: 2019-07-21

## 2019-07-21 RX ORDER — SODIUM CHLORIDE 9 MG/ML
3 INJECTION INTRAMUSCULAR; INTRAVENOUS; SUBCUTANEOUS EVERY 8 HOURS
Refills: 0 | Status: DISCONTINUED | OUTPATIENT
Start: 2019-07-21 | End: 2019-07-24

## 2019-07-21 RX ORDER — HYDROMORPHONE HYDROCHLORIDE 2 MG/ML
1 INJECTION INTRAMUSCULAR; INTRAVENOUS; SUBCUTANEOUS
Qty: 10 | Refills: 0
Start: 2019-07-21 | End: 2019-07-23

## 2019-07-21 RX ADMIN — HYDROMORPHONE HYDROCHLORIDE 2 MILLIGRAM(S): 2 INJECTION INTRAMUSCULAR; INTRAVENOUS; SUBCUTANEOUS at 00:01

## 2019-07-21 RX ADMIN — Medication 100 MILLIGRAM(S): at 05:50

## 2019-07-21 RX ADMIN — Medication 600 MILLIGRAM(S): at 12:00

## 2019-07-21 RX ADMIN — HYDROMORPHONE HYDROCHLORIDE 2 MILLIGRAM(S): 2 INJECTION INTRAMUSCULAR; INTRAVENOUS; SUBCUTANEOUS at 17:01

## 2019-07-21 RX ADMIN — HEPARIN SODIUM 5000 UNIT(S): 5000 INJECTION INTRAVENOUS; SUBCUTANEOUS at 17:08

## 2019-07-21 RX ADMIN — SODIUM CHLORIDE 3 MILLILITER(S): 9 INJECTION INTRAMUSCULAR; INTRAVENOUS; SUBCUTANEOUS at 01:34

## 2019-07-21 RX ADMIN — Medication 600 MILLIGRAM(S): at 00:01

## 2019-07-21 RX ADMIN — Medication 600 MILLIGRAM(S): at 05:50

## 2019-07-21 RX ADMIN — Medication 600 MILLIGRAM(S): at 13:00

## 2019-07-21 RX ADMIN — HYDROMORPHONE HYDROCHLORIDE 2 MILLIGRAM(S): 2 INJECTION INTRAMUSCULAR; INTRAVENOUS; SUBCUTANEOUS at 09:20

## 2019-07-21 RX ADMIN — SODIUM CHLORIDE 3 MILLILITER(S): 9 INJECTION INTRAMUSCULAR; INTRAVENOUS; SUBCUTANEOUS at 14:19

## 2019-07-21 RX ADMIN — Medication 10 MILLIGRAM(S): at 17:08

## 2019-07-21 RX ADMIN — HYDROMORPHONE HYDROCHLORIDE 2 MILLIGRAM(S): 2 INJECTION INTRAMUSCULAR; INTRAVENOUS; SUBCUTANEOUS at 18:00

## 2019-07-21 RX ADMIN — Medication 100 MILLIGRAM(S): at 12:00

## 2019-07-21 RX ADMIN — Medication 100 MILLIGRAM(S): at 08:25

## 2019-07-21 RX ADMIN — AMLODIPINE BESYLATE 10 MILLIGRAM(S): 2.5 TABLET ORAL at 05:51

## 2019-07-21 RX ADMIN — HYDROMORPHONE HYDROCHLORIDE 2 MILLIGRAM(S): 2 INJECTION INTRAMUSCULAR; INTRAVENOUS; SUBCUTANEOUS at 08:25

## 2019-07-21 RX ADMIN — Medication 600 MILLIGRAM(S): at 19:30

## 2019-07-21 RX ADMIN — HEPARIN SODIUM 5000 UNIT(S): 5000 INJECTION INTRAVENOUS; SUBCUTANEOUS at 05:50

## 2019-07-21 NOTE — PROGRESS NOTE ADULT - SUBJECTIVE AND OBJECTIVE BOX
R2 GYN Progress Note    POD# 2  HD#3    Patient seen and examined at bedside. Patient had low grade fever @ 6pm to 38.1. Patient was given motrin with resolution of fever. Patient was tachycardic but informed us that she has a long standing history of tachycardia that was previously worked up. She was told by cardiology that there was nothing to do. No acute complaints.  Pain well controlled.  Patient is ambulating and tolerating regular diet  Patient is not passing flatus   Patient is voiding spontaneously  Denies CP, SOB, N/V, fevers, and chills.    Vital Signs Last 24 Hours  T(C): 37 (07-21-19 @ 05:50), Max: 38.2 (07-20-19 @ 18:01)  HR: 109 (07-21-19 @ 05:50) (101 - 135)  BP: 131/81 (07-21-19 @ 05:50) (120/76 - 133/87)  RR: 19 (07-21-19 @ 05:50) (17 - 20)  SpO2: 97% (07-21-19 @ 05:50) (97% - 99%)    I&O's Summary    20 Jul 2019 07:01  -  21 Jul 2019 07:00  --------------------------------------------------------  IN: 2000 mL / OUT: 2665 mL / NET: -665 mL        Physical Exam:  General: NAD  CV: RR, S1, S2, no M/R/G  Lungs: CTA b/l, good air flow b/l   Abdomen: Soft, appropriately-tender, softly distended, tympanic, normoactive bowel sounds, no guarding/rebound  Incision: Low transverse incision CDI  : no bleeding on pad  Ext: No pain or swelling     Labs:                        12.9   15.39 )-----------( 201      ( 20 Jul 2019 06:00 )             36.5   baso x      eos x      imm gran x      lymph x      mono x      poly x                            13.5   18.46 )-----------( 212      ( 19 Jul 2019 22:55 )             38.2   baso x      eos x      imm gran x      lymph x      mono x      poly x          MEDICATIONS  (STANDING):  amLODIPine   Tablet 10 milliGRAM(s) Oral daily  heparin  Injectable 5000 Unit(s) SubCutaneous every 12 hours  ibuprofen  Tablet. 600 milliGRAM(s) Oral every 6 hours    MEDICATIONS  (PRN):  docusate sodium 100 milliGRAM(s) Oral three times a day PRN Stool Softening  HYDROmorphone   Tablet 2 milliGRAM(s) Oral every 4 hours PRN Severe Pain (7 - 10)  senna 2 Tablet(s) Oral at bedtime PRN Constipation

## 2019-07-21 NOTE — PROGRESS NOTE ADULT - PROBLEM SELECTOR PLAN 1
Neuro: PO pain meds.   CV: Resolving tachycardia. Has long history of benign sinus tachycardia. Previously worked up by cardiology with nothing found. Continue to monitor VSQ4h.   Pulm: Saturating well on room air, encourage oob/amb  GI: Continue regular diet  : Voiding spontanously   Heme: c/w HSQ and SCDs for DVT ppx  FEN: LR@75. Can SLIV since tolerating regular diet now.  ID: Low grade fever to 38.1 overnight. Patient asymptomatic with resolution of fever. Will monitor Temperature and start ABx if occurs again  Endo: No active issues   Dispo: Continue routine post-op care    Fercho Prabhakar PGY-2

## 2019-07-21 NOTE — DISCHARGE NOTE PROVIDER - CARE PROVIDER_API CALL
Eleni Hummel)  Obstetrics and Gynecology  [address] [place] [address]  Phone: [phone]  Fax: [phone]  Follow Up Time:

## 2019-07-21 NOTE — PROGRESS NOTE ADULT - ASSESSMENT
A/P: 49y POD#2 s/p DAVIE, BS, abdominoplasty, repair of umbilical hernia. She had a low grade fever which resolved after motrin. She was tachycardic to 130 overnight; however, improving this morning. Patient pain well controlled. She is ambulating and tolerating regular diet. Will continue to monitor vitals.

## 2019-07-21 NOTE — DISCHARGE NOTE PROVIDER - NSDCCPTREATMENT_GEN_ALL_CORE_FT
PRINCIPAL PROCEDURE  Procedure: Total abdominal hysterectomy with bilateral salpingectomy  Findings and Treatment:       SECONDARY PROCEDURE  Procedure: Abdominoplasty  Findings and Treatment:

## 2019-07-21 NOTE — DISCHARGE NOTE PROVIDER - HOSPITAL COURSE
The patient was admitted for a  scheduled Total abdominal hysterectomy, bilateral salpingectomy, abdominoplasty, and repair of umbilical hernia. Estimated blood loss was 200. Surgery was uncomplicated and patient tolerated the procedure well. On post operative day 1, the patient was tolerating regular diet, voiding, ambulating, and switched to oral pain medications. Pain was well controlled. She had a low grade fever to 38.1C that resolved spontaneously. On post operative day 2, the patient started passing gas. Vitals were stable. Labs were trended appropriately with stable H/H. Patient remained afebrile. She was feeling well at time of discharge and meeting all post-operative milestones. The patient was admitted for a  scheduled Total abdominal hysterectomy, bilateral salpingectomy, abdominoplasty, and repair of umbilical hernia. Estimated blood loss was 200. Surgery was uncomplicated and patient tolerated the procedure well. On post operative day 1, the patient was tolerating regular diet, voiding, ambulating, and switched to oral pain medications. Pain was well controlled. She had a low grade fever to 38.1C that resolved spontaneously. On post operative day 2, vitals were stable. Labs were trended appropriately with stable H/H. Patient remained afebrile. Post operative day 3, she started passing gas and was stable for discharge. She was feeling well at time of discharge and meeting all post-operative milestones. The patient was admitted for a  scheduled Total abdominal hysterectomy, bilateral salpingectomy, abdominoplasty, and repair of umbilical hernia. Estimated blood loss was 200. Surgery was uncomplicated and patient tolerated the procedure well. On post operative day 1, the patient was tolerating regular diet, voiding, ambulating, and switched to oral pain medications. Pain was well controlled. She had a low grade fever to 38.1C that resolved spontaneously. On post operative day 2, vitals were stable. Labs were trended appropriately with stable H/H. Patient remained afebrile. Post operative day 3, she started passing gas however had a left lower back pain. CT was ordered to rule out ureteral injury. Patient refused to lie down due to pain for the CT scan and it was not performed. Post operative day 4, vitals remained stable and the patient's pain had significantly improved. Her CT showed no signs of injury and only normal post operative changed. She was stable for discharge. She was feeling well at time of discharge and meeting all post-operative milestones.

## 2019-07-21 NOTE — PROGRESS NOTE ADULT - SUBJECTIVE AND OBJECTIVE BOX
Progress Note:  GYN attending: POD #2:  S; no c/o; states feels fine  O; VSS.  Vioc=946.6 at 10pm; 99.5 at 00:43     Abd: dressing dry and intact; J-P drains with minmal drainage     Pelvic:deferred     Exts: no CCE  Labs: H&H stable  voiding well  A: S/P TAHBS with abdominoplasty  P; d/c IV      for possible discharge today

## 2019-07-21 NOTE — PROGRESS NOTE ADULT - SUBJECTIVE AND OBJECTIVE BOX
Attending Plastic Surgery    Taking regular diet and ambulating well.  Dressing changed. Incisions intact, dry.  KATERYNA serosanguinous.  Ready for discharge. Will follow as outpatient.    Dr. Vallejo

## 2019-07-22 ENCOUNTER — TRANSCRIPTION ENCOUNTER (OUTPATIENT)
Age: 49
End: 2019-07-22

## 2019-07-22 LAB
ALBUMIN SERPL ELPH-MCNC: 3.6 G/DL — SIGNIFICANT CHANGE UP (ref 3.3–5)
ALP SERPL-CCNC: 96 U/L — SIGNIFICANT CHANGE UP (ref 40–120)
ALT FLD-CCNC: 14 U/L — SIGNIFICANT CHANGE UP (ref 4–33)
ANION GAP SERPL CALC-SCNC: 12 MMO/L — SIGNIFICANT CHANGE UP (ref 7–14)
AST SERPL-CCNC: 26 U/L — SIGNIFICANT CHANGE UP (ref 4–32)
BILIRUB SERPL-MCNC: 0.4 MG/DL — SIGNIFICANT CHANGE UP (ref 0.2–1.2)
BUN SERPL-MCNC: 6 MG/DL — LOW (ref 7–23)
CALCIUM SERPL-MCNC: 9.4 MG/DL — SIGNIFICANT CHANGE UP (ref 8.4–10.5)
CHLORIDE SERPL-SCNC: 95 MMOL/L — LOW (ref 98–107)
CO2 SERPL-SCNC: 28 MMOL/L — SIGNIFICANT CHANGE UP (ref 22–31)
CREAT SERPL-MCNC: 0.58 MG/DL — SIGNIFICANT CHANGE UP (ref 0.5–1.3)
GLUCOSE SERPL-MCNC: 123 MG/DL — HIGH (ref 70–99)
HCT VFR BLD CALC: 35.6 % — SIGNIFICANT CHANGE UP (ref 34.5–45)
HGB BLD-MCNC: 12.6 G/DL — SIGNIFICANT CHANGE UP (ref 11.5–15.5)
MCHC RBC-ENTMCNC: 27.6 PG — SIGNIFICANT CHANGE UP (ref 27–34)
MCHC RBC-ENTMCNC: 35.4 % — SIGNIFICANT CHANGE UP (ref 32–36)
MCV RBC AUTO: 77.9 FL — LOW (ref 80–100)
NRBC # FLD: 0.02 K/UL — SIGNIFICANT CHANGE UP (ref 0–0)
PLATELET # BLD AUTO: 201 K/UL — SIGNIFICANT CHANGE UP (ref 150–400)
PMV BLD: 11.4 FL — SIGNIFICANT CHANGE UP (ref 7–13)
POTASSIUM SERPL-MCNC: 3.5 MMOL/L — SIGNIFICANT CHANGE UP (ref 3.5–5.3)
POTASSIUM SERPL-SCNC: 3.5 MMOL/L — SIGNIFICANT CHANGE UP (ref 3.5–5.3)
PROT SERPL-MCNC: 8.3 G/DL — SIGNIFICANT CHANGE UP (ref 6–8.3)
RBC # BLD: 4.57 M/UL — SIGNIFICANT CHANGE UP (ref 3.8–5.2)
RBC # FLD: 16 % — HIGH (ref 10.3–14.5)
SODIUM SERPL-SCNC: 135 MMOL/L — SIGNIFICANT CHANGE UP (ref 135–145)
WBC # BLD: 16.86 K/UL — HIGH (ref 3.8–10.5)
WBC # FLD AUTO: 16.86 K/UL — HIGH (ref 3.8–10.5)

## 2019-07-22 RX ORDER — HYDROMORPHONE HYDROCHLORIDE 2 MG/ML
1 INJECTION INTRAMUSCULAR; INTRAVENOUS; SUBCUTANEOUS
Qty: 10 | Refills: 0
Start: 2019-07-22 | End: 2019-07-24

## 2019-07-22 RX ORDER — SIMETHICONE 80 MG/1
80 TABLET, CHEWABLE ORAL
Refills: 0 | Status: DISCONTINUED | OUTPATIENT
Start: 2019-07-22 | End: 2019-07-24

## 2019-07-22 RX ADMIN — HYDROMORPHONE HYDROCHLORIDE 2 MILLIGRAM(S): 2 INJECTION INTRAMUSCULAR; INTRAVENOUS; SUBCUTANEOUS at 00:58

## 2019-07-22 RX ADMIN — Medication 600 MILLIGRAM(S): at 13:26

## 2019-07-22 RX ADMIN — Medication 600 MILLIGRAM(S): at 06:35

## 2019-07-22 RX ADMIN — HYDROMORPHONE HYDROCHLORIDE 2 MILLIGRAM(S): 2 INJECTION INTRAMUSCULAR; INTRAVENOUS; SUBCUTANEOUS at 01:35

## 2019-07-22 RX ADMIN — SIMETHICONE 80 MILLIGRAM(S): 80 TABLET, CHEWABLE ORAL at 06:34

## 2019-07-22 RX ADMIN — AMLODIPINE BESYLATE 10 MILLIGRAM(S): 2.5 TABLET ORAL at 06:35

## 2019-07-22 RX ADMIN — SODIUM CHLORIDE 3 MILLILITER(S): 9 INJECTION INTRAMUSCULAR; INTRAVENOUS; SUBCUTANEOUS at 06:43

## 2019-07-22 RX ADMIN — Medication 600 MILLIGRAM(S): at 00:57

## 2019-07-22 RX ADMIN — Medication 100 MILLIGRAM(S): at 00:57

## 2019-07-22 RX ADMIN — HEPARIN SODIUM 5000 UNIT(S): 5000 INJECTION INTRAVENOUS; SUBCUTANEOUS at 06:34

## 2019-07-22 RX ADMIN — Medication 100 MILLIGRAM(S): at 06:35

## 2019-07-22 RX ADMIN — SIMETHICONE 80 MILLIGRAM(S): 80 TABLET, CHEWABLE ORAL at 17:43

## 2019-07-22 RX ADMIN — SENNA PLUS 2 TABLET(S): 8.6 TABLET ORAL at 00:57

## 2019-07-22 RX ADMIN — HYDROMORPHONE HYDROCHLORIDE 2 MILLIGRAM(S): 2 INJECTION INTRAMUSCULAR; INTRAVENOUS; SUBCUTANEOUS at 23:44

## 2019-07-22 RX ADMIN — Medication 600 MILLIGRAM(S): at 18:13

## 2019-07-22 RX ADMIN — Medication 600 MILLIGRAM(S): at 17:43

## 2019-07-22 RX ADMIN — HYDROMORPHONE HYDROCHLORIDE 2 MILLIGRAM(S): 2 INJECTION INTRAMUSCULAR; INTRAVENOUS; SUBCUTANEOUS at 10:34

## 2019-07-22 RX ADMIN — SODIUM CHLORIDE 3 MILLILITER(S): 9 INJECTION INTRAMUSCULAR; INTRAVENOUS; SUBCUTANEOUS at 15:25

## 2019-07-22 RX ADMIN — HEPARIN SODIUM 5000 UNIT(S): 5000 INJECTION INTRAVENOUS; SUBCUTANEOUS at 17:44

## 2019-07-22 RX ADMIN — HYDROMORPHONE HYDROCHLORIDE 2 MILLIGRAM(S): 2 INJECTION INTRAMUSCULAR; INTRAVENOUS; SUBCUTANEOUS at 11:04

## 2019-07-22 RX ADMIN — Medication 600 MILLIGRAM(S): at 12:56

## 2019-07-22 RX ADMIN — SODIUM CHLORIDE 3 MILLILITER(S): 9 INJECTION INTRAMUSCULAR; INTRAVENOUS; SUBCUTANEOUS at 21:20

## 2019-07-22 RX ADMIN — Medication 600 MILLIGRAM(S): at 01:35

## 2019-07-22 NOTE — PROGRESS NOTE ADULT - SUBJECTIVE AND OBJECTIVE BOX
R2 GYN Progress Note    POD#3   HD#4    Patient seen and examined at bedside.  No acute events overnight. No acute complaints.  Pain well controlled.  Patient is ambulating and tolerating regular diet   Patient has not yet passed flatus. Took dulcolax suppository yesterday. Will take more stool softener this morning.  Patient is voiding spontanously  Denies CP, SOB, N/V, fevers, and chills.    Vital Signs Last 24 Hours  T(C): 36.3 (07-22-19 @ 06:44), Max: 37 (07-21-19 @ 10:22)  HR: 118 (07-22-19 @ 06:44) (71 - 120)  BP: 142/95 (07-22-19 @ 06:44) (105/61 - 142/95)  RR: 18 (07-22-19 @ 06:44) (16 - 18)  SpO2: 96% (07-22-19 @ 06:44) (90% - 99%)    I&O's Summary    20 Jul 2019 07:01  -  21 Jul 2019 07:00  --------------------------------------------------------  IN: 2000 mL / OUT: 2665 mL / NET: -665 mL    21 Jul 2019 07:01  -  22 Jul 2019 06:52  --------------------------------------------------------  IN: 1000 mL / OUT: 725 mL / NET: 275 mL        Physical Exam:  General: NAD  CV: RR, S1, S2, no M/R/G  Lungs: CTA b/l, good air flow b/l   Abdomen: Soft, appropriately-tender, softly distended, tympanic  Incision: Low transverse abdominal incision CDI  Ext: No pain or swelling     Labs:                        12.9   15.39 )-----------( 201      ( 20 Jul 2019 06:00 )             36.5   baso x      eos x      imm gran x      lymph x      mono x      poly x                            13.5   18.46 )-----------( 212      ( 19 Jul 2019 22:55 )             38.2   baso x      eos x      imm gran x      lymph x      mono x      poly x          MEDICATIONS  (STANDING):  amLODIPine   Tablet 10 milliGRAM(s) Oral daily  heparin  Injectable 5000 Unit(s) SubCutaneous every 12 hours  ibuprofen  Tablet. 600 milliGRAM(s) Oral every 6 hours  simethicone 80 milliGRAM(s) Chew two times a day  sodium chloride 0.9% lock flush 3 milliLiter(s) IV Push every 8 hours    MEDICATIONS  (PRN):  docusate sodium 100 milliGRAM(s) Oral three times a day PRN Stool Softening  HYDROmorphone   Tablet 2 milliGRAM(s) Oral every 4 hours PRN Severe Pain (7 - 10)  senna 2 Tablet(s) Oral at bedtime PRN Constipation

## 2019-07-22 NOTE — PROGRESS NOTE ADULT - SUBJECTIVE AND OBJECTIVE BOX
Plastic Surgery Progress Note (pg [facility]: [id number], [facility]: [phone])    SUBJECTIVE:  The patient was seen and examined this morning during rouds. No acute events overnight.    OBJECTIVE:     ** VITAL SIGNS / I&O's **    Vital Signs Last 24 Hrs  T(C): 36.3 (22 Jul 2019 06:44), Max: 37 (21 Jul 2019 10:22)  T(F): 97.4 (22 Jul 2019 06:44), Max: 98.6 (21 Jul 2019 10:22)  HR: 118 (22 Jul 2019 06:44) (71 - 120)  BP: 142/95 (22 Jul 2019 06:44) (105/61 - 142/95)  BP(mean): --  RR: 18 (22 Jul 2019 06:44) (16 - 18)  SpO2: 96% (22 Jul 2019 06:44) (90% - 99%)      21 Jul 2019 07:01  -  22 Jul 2019 07:00  --------------------------------------------------------  IN:    Oral Fluid: 1000 mL  Total IN: 1000 mL    OUT:    Bulb: 15 mL    Bulb: 10 mL    Voided: 700 mL  Total OUT: 725 mL    Total NET: 275 mL          ** PHYSICAL EXAM **    -- CONSTITUTIONAL: Alert, NAD   -- ABDOMEN: soft, appropriately tender, incision c/d/i no collections appreciated, JPs serosang, umbo viable  ** LABS **    ** MEDICATIONS **  MEDICATIONS  (STANDING):  amLODIPine   Tablet 10 milliGRAM(s) Oral daily  heparin  Injectable 5000 Unit(s) SubCutaneous every 12 hours  ibuprofen  Tablet. 600 milliGRAM(s) Oral every 6 hours  simethicone 80 milliGRAM(s) Chew two times a day  sodium chloride 0.9% lock flush 3 milliLiter(s) IV Push every 8 hours    MEDICATIONS  (PRN):  docusate sodium 100 milliGRAM(s) Oral three times a day PRN Stool Softening  HYDROmorphone   Tablet 2 milliGRAM(s) Oral every 4 hours PRN Severe Pain (7 - 10)  senna 2 Tablet(s) Oral at bedtime PRN Constipation

## 2019-07-22 NOTE — DISCHARGE NOTE NURSING/CASE MANAGEMENT/SOCIAL WORK - NSDCDPATPORTLINK_GEN_ALL_CORE
You can access the [facility] Patient Portal, offered by [facility], by registering with the following website: [email or web address]

## 2019-07-22 NOTE — PROGRESS NOTE ADULT - ASSESSMENT
48 y/o F s/p DAVIE, umbilical hernia repair, abdominoplasty on 7/19 recovering well  - continue abdominal binder  - continue KATERYNA drains  - keep bed flexed  - may ambulate flexed slightly at waist  - dvt ppx  - pain control  - dispo per gyn, okay to go home from plastic surgery perspective  - Please follow up with Dr. Vallejo within x1 week after discharge from the hospital. You may call [phone] to schedule an appointment.      Plastic Surgery (pg [facility]: [id number], [facility]: [phone])

## 2019-07-22 NOTE — PROVIDER CONTACT NOTE (OTHER) - SITUATION
Patient was unable to perform abdominal x-ray because patient could not lay flat Patient was unable to perform abdominal CT because patient could not lay flat

## 2019-07-22 NOTE — PROGRESS NOTE ADULT - PROBLEM SELECTOR PLAN 1
Neuro: PO pain meds.   CV: Hemodynamically stable. Patient still tachycardic but asymptomatic with negative workup as outpatient by cardiology. Continue VS Q4h.   Pulm: Saturating well on room air, encourage oob/amb  GI:Continue regular diet .Continue stool softeners until passes flatus  : Voiding spontanously   Heme: c/w HSQ and SCDs for DVT ppx  FEN: SLIV  ID: Afebrile. Previous low grade fever to 38.1C resolved.  Will continue to monitor temperature.  Endo: No active issues   Dispo: Continue routine post-op care. Plan for discharge today    Fercho Prabhakar PGY-2

## 2019-07-22 NOTE — DISCHARGE NOTE NURSING/CASE MANAGEMENT/SOCIAL WORK - NSDCPNINST_GEN_ALL_CORE
NOTIFY MD OF fever, chills, nausea, vomiting, significant increase of vaginal bleeding, pain unrelieved by pain meds.  KATERYNA teaching provided. verbalized understanding, able to teach back.

## 2019-07-22 NOTE — PROGRESS NOTE ADULT - ASSESSMENT
A/P: 49y POD#3 s/p DAVIE, BS, abdominoplasty, repair of umbilical hernia. She had a low grade fever which resolved after motrin. She was tachycardic to 125 overnight; however, improving this morning. Patient previously worked up as outpatient by cardiology for tachycardia. Workup was negative. Patient pain well controlled. She is ambulating and tolerating regular diet. Patient has no passed flatus. Will continue stool softeners. Will continue to monitor vitals.

## 2019-07-23 PROCEDURE — 74178 CT ABD&PLV WO CNTR FLWD CNTR: CPT | Mod: 26

## 2019-07-23 RX ORDER — ALPRAZOLAM 0.25 MG
0.5 TABLET ORAL ONCE
Refills: 0 | Status: DISCONTINUED | OUTPATIENT
Start: 2019-07-23 | End: 2019-07-23

## 2019-07-23 RX ADMIN — Medication 600 MILLIGRAM(S): at 15:57

## 2019-07-23 RX ADMIN — SODIUM CHLORIDE 3 MILLILITER(S): 9 INJECTION INTRAMUSCULAR; INTRAVENOUS; SUBCUTANEOUS at 21:53

## 2019-07-23 RX ADMIN — HEPARIN SODIUM 5000 UNIT(S): 5000 INJECTION INTRAVENOUS; SUBCUTANEOUS at 06:22

## 2019-07-23 RX ADMIN — SODIUM CHLORIDE 3 MILLILITER(S): 9 INJECTION INTRAMUSCULAR; INTRAVENOUS; SUBCUTANEOUS at 06:08

## 2019-07-23 RX ADMIN — SENNA PLUS 2 TABLET(S): 8.6 TABLET ORAL at 00:36

## 2019-07-23 RX ADMIN — SODIUM CHLORIDE 3 MILLILITER(S): 9 INJECTION INTRAMUSCULAR; INTRAVENOUS; SUBCUTANEOUS at 14:21

## 2019-07-23 RX ADMIN — SIMETHICONE 80 MILLIGRAM(S): 80 TABLET, CHEWABLE ORAL at 06:22

## 2019-07-23 RX ADMIN — HYDROMORPHONE HYDROCHLORIDE 2 MILLIGRAM(S): 2 INJECTION INTRAMUSCULAR; INTRAVENOUS; SUBCUTANEOUS at 00:30

## 2019-07-23 RX ADMIN — Medication 600 MILLIGRAM(S): at 08:45

## 2019-07-23 RX ADMIN — AMLODIPINE BESYLATE 10 MILLIGRAM(S): 2.5 TABLET ORAL at 06:22

## 2019-07-23 RX ADMIN — Medication 600 MILLIGRAM(S): at 09:15

## 2019-07-23 RX ADMIN — Medication 600 MILLIGRAM(S): at 01:03

## 2019-07-23 RX ADMIN — Medication 600 MILLIGRAM(S): at 23:16

## 2019-07-23 RX ADMIN — Medication 600 MILLIGRAM(S): at 01:40

## 2019-07-23 RX ADMIN — Medication 600 MILLIGRAM(S): at 23:50

## 2019-07-23 RX ADMIN — Medication 600 MILLIGRAM(S): at 16:27

## 2019-07-23 RX ADMIN — Medication 0.5 MILLIGRAM(S): at 17:49

## 2019-07-23 NOTE — PROGRESS NOTE ADULT - SUBJECTIVE AND OBJECTIVE BOX
R2 GYN Progress Note    POD# 4  HD#5    Patient seen and examined at bedside.  No acute events overnight. No acute complaints.  Pain well controlled. Still endorses pain on left lower back when walking; however, has improved since yesterday.  Patient is ambulating and tolerating regular diet  Patient is passing flatus and has had a bowel movement.  Patient is voiding spontaneously  Denies CP, SOB, N/V, fevers, and chills.    Vital Signs Last 24 Hours  T(C): 36.9 (07-23-19 @ 06:21), Max: 36.9 (07-22-19 @ 09:10)  HR: 109 (07-23-19 @ 06:21) (109 - 122)  BP: 126/86 (07-23-19 @ 06:21) (115/74 - 146/87)  RR: 18 (07-23-19 @ 06:21) (16 - 18)  SpO2: 96% (07-23-19 @ 06:21) (96% - 100%)    I&O's Summary    22 Jul 2019 07:01  -  23 Jul 2019 07:00  --------------------------------------------------------  IN: 0 mL / OUT: 1860.5 mL / NET: -1860.5 mL        Physical Exam:  General: NAD  Abdomen: Soft, appropriately-tender, softly distended, tympanic, normoactive bowel sounds  Ext: No pain or swelling     Labs:                        12.6   16.86 )-----------( 201      ( 22 Jul 2019 12:39 )             35.6   baso x      eos x      imm gran x      lymph x      mono x      poly x          MEDICATIONS  (STANDING):  amLODIPine   Tablet 10 milliGRAM(s) Oral daily  heparin  Injectable 5000 Unit(s) SubCutaneous every 12 hours  ibuprofen  Tablet. 600 milliGRAM(s) Oral every 6 hours  simethicone 80 milliGRAM(s) Chew two times a day  sodium chloride 0.9% lock flush 3 milliLiter(s) IV Push every 8 hours    MEDICATIONS  (PRN):  docusate sodium 100 milliGRAM(s) Oral three times a day PRN Stool Softening  HYDROmorphone   Tablet 2 milliGRAM(s) Oral every 4 hours PRN Severe Pain (7 - 10)  senna 2 Tablet(s) Oral at bedtime PRN Constipation R2 GYN Progress Note    POD# 4  HD#5    Patient seen and examined at bedside.  Overnight, patient unable to tolerate CT scan as she did not want to lie flat. She was told that she could not lie flat after abdominoplasty and therefore refused CT. No acute complaints.  Pain well controlled. Still endorses pain on left lower back when walking; however, has improved since yesterday.  Patient is ambulating and tolerating regular diet  Patient is passing flatus and has had a bowel movement.  Patient is voiding spontaneously  Denies CP, SOB, N/V, fevers, and chills.    Vital Signs Last 24 Hours  T(C): 36.9 (07-23-19 @ 06:21), Max: 36.9 (07-22-19 @ 09:10)  HR: 109 (07-23-19 @ 06:21) (109 - 122)  BP: 126/86 (07-23-19 @ 06:21) (115/74 - 146/87)  RR: 18 (07-23-19 @ 06:21) (16 - 18)  SpO2: 96% (07-23-19 @ 06:21) (96% - 100%)    I&O's Summary    22 Jul 2019 07:01  -  23 Jul 2019 07:00  --------------------------------------------------------  IN: 0 mL / OUT: 1860.5 mL / NET: -1860.5 mL        Physical Exam:  General: NAD  Abdomen: Soft, appropriately-tender, softly distended, tympanic, normoactive bowel sounds  Ext: No pain or swelling     Labs:                        12.6   16.86 )-----------( 201      ( 22 Jul 2019 12:39 )             35.6   baso x      eos x      imm gran x      lymph x      mono x      poly x          MEDICATIONS  (STANDING):  amLODIPine   Tablet 10 milliGRAM(s) Oral daily  heparin  Injectable 5000 Unit(s) SubCutaneous every 12 hours  ibuprofen  Tablet. 600 milliGRAM(s) Oral every 6 hours  simethicone 80 milliGRAM(s) Chew two times a day  sodium chloride 0.9% lock flush 3 milliLiter(s) IV Push every 8 hours    MEDICATIONS  (PRN):  docusate sodium 100 milliGRAM(s) Oral three times a day PRN Stool Softening  HYDROmorphone   Tablet 2 milliGRAM(s) Oral every 4 hours PRN Severe Pain (7 - 10)  senna 2 Tablet(s) Oral at bedtime PRN Constipation

## 2019-07-23 NOTE — CHART NOTE - NSCHARTNOTEFT_GEN_A_CORE
R2 Chart Note    CT scan reviewed with radiology resident and attending, Dr. Eleni Hummel. No evidence of ureteral injury. Collection in posterior cul de sac consistent with post op changes and placement of nando (hemostatic agent) at the cuff. Patient is stable for discharge. Order placed, and plan discussed with nursing.    d/w Dr. Shaheed Low PGY2

## 2019-07-23 NOTE — PROGRESS NOTE ADULT - ASSESSMENT
A/P: 49y POD#4 s/p DAVIE, BS, abdominoplasty, repair of umbilical hernia. She had a low grade fever which resolved after motrin. Patient at times tachycardic. She was previously worked up as outpatient by cardiology for tachycardia. Workup was negative. Patient pain well controlled. She is ambulating and tolerating regular diet. Patient has passed flatus and had a bowel movement. Patient was complaining of a left lower back pain when she walks. she said it has been improving since yesterday. CT was ordered; however, patient was unable to tolerate the CT as she did not want to lie flat. A/P: 49y POD#4 s/p DAVIE, BS, abdominoplasty, repair of umbilical hernia. She had a low grade fever which resolved after motrin. Patient at times tachycardic. She was previously worked up as outpatient by cardiology for tachycardia. Workup was negative. She is meeting all post-operative milestones. Patient unable to tolerate CT scan. Low suspicion for Urinary injury as her creatinine is wnl and the pain is present only when she ambulates.

## 2019-07-23 NOTE — PROGRESS NOTE ADULT - ASSESSMENT
48 y/o F s/p DAVIE, umbilical hernia repair, abdominoplasty on 7/19    - continue abdominal binder  - continue KATERYNA drains  - keep bed flexed  - may ambulate flexed slightly at waist  - dvt ppx  - pain control  - dispo per gyn  - CT scan per gyn, postop pain is not unexpected after abdominoplasty  - Please follow up with Dr. Vallejo within x1 week after discharge from the hospital. You may call [phone] to schedule an appointment.      Plastic Surgery (pg [facility]: [id number], [place]: [phone])

## 2019-07-23 NOTE — PROVIDER CONTACT NOTE (OTHER) - BACKGROUND
Pt. baseline tachycardic, pt. reports family history of being tachycardic asymptomatically
Pt had DAVIE, BSO and abd. plasty.
patient alert and oriented,

## 2019-07-23 NOTE — PROVIDER CONTACT NOTE (OTHER) - ACTION/TREATMENT ORDERED:
Continue to monitor pt.
MD made aware. No new orders received. Will continue to monitor.
MD made aware. No new orders received. Will continue to monitor.
no action now

## 2019-07-23 NOTE — PROVIDER CONTACT NOTE (OTHER) - ASSESSMENT
Pt. afebrile, denies any other symptoms
Pt AxOx4. VS stable.
patient encouraged to use incentive spirometer, out of bed

## 2019-07-23 NOTE — PROGRESS NOTE ADULT - PROBLEM SELECTOR PLAN 1
Neuro: PO pain meds.   CV: tachycardia improving. Continue VS Q4hr   Pulm: Saturating well on room air, encourage oob/amb  GI: Continue regular diet  : Voiding spontanously. Patient could not tolerate CT scan as she did not want to lie flat. Creatinine wnl. vitals stable. Will reassess need for CT scan.  Heme: c/w HSQ and SCDs for DVT ppx  ID: Afebrile  Endo: No active issues   Dispo: Continue routine post-op care. discharge planning    Fercho Prabhakar PGY-2

## 2019-07-23 NOTE — PROGRESS NOTE ADULT - SUBJECTIVE AND OBJECTIVE BOX
Plastic Surgery Progress Note (pg [facility]: [id number], [facility]: [phone])    SUBJECTIVE:  The patient was seen and examined this morning during rouds. No acute events overnight. CT abd/ pelvis ordered to evaluate flank pain, patient unable to lay flat for scan.    OBJECTIVE:     ** VITAL SIGNS / I&O's **    Vital Signs Last 24 Hrs  T(C): 36.9 (23 Jul 2019 06:21), Max: 36.9 (22 Jul 2019 09:10)  T(F): 98.5 (23 Jul 2019 06:21), Max: 98.5 (22 Jul 2019 21:31)  HR: 109 (23 Jul 2019 06:21) (109 - 122)  BP: 126/86 (23 Jul 2019 06:21) (115/74 - 146/87)  BP(mean): --  RR: 18 (23 Jul 2019 06:21) (16 - 18)  SpO2: 96% (23 Jul 2019 06:21) (96% - 100%)      22 Jul 2019 07:01  -  23 Jul 2019 07:00  --------------------------------------------------------  IN:  Total IN: 0 mL    OUT:    Bulb: 30.5 mL    Bulb: 30 mL    Voided: 1800 mL  Total OUT: 1860.5 mL    Total NET: -1860.5 mL          ** PHYSICAL EXAM **    -- CONSTITUTIONAL: Alert, NAD   -- ABDOMEN: soft, appropriately tender, incision c/d/i no collections appreciated, JPs serosang, umbo viable, area of tenderness at left flank, no sign of collection, erythema, or overlying skin changes    ** LABS **                          12.6   16.86 )-----------( 201      ( 22 Jul 2019 12:39 )             35.6     22 Jul 2019 12:39    135    |  95     |  6      ----------------------------<  123    3.5     |  28     |  0.58     Ca    9.4        22 Jul 2019 12:39    TPro  8.3    /  Alb  3.6    /  TBili  0.4    /  DBili  x      /  AST  26     /  ALT  14     /  AlkPhos  96     22 Jul 2019 12:39      ** MEDICATIONS **  MEDICATIONS  (STANDING):  amLODIPine   Tablet 10 milliGRAM(s) Oral daily  heparin  Injectable 5000 Unit(s) SubCutaneous every 12 hours  ibuprofen  Tablet. 600 milliGRAM(s) Oral every 6 hours  simethicone 80 milliGRAM(s) Chew two times a day  sodium chloride 0.9% lock flush 3 milliLiter(s) IV Push every 8 hours    MEDICATIONS  (PRN):  docusate sodium 100 milliGRAM(s) Oral three times a day PRN Stool Softening  HYDROmorphone   Tablet 2 milliGRAM(s) Oral every 4 hours PRN Severe Pain (7 - 10)  senna 2 Tablet(s) Oral at bedtime PRN Constipation

## 2019-07-24 VITALS
SYSTOLIC BLOOD PRESSURE: 129 MMHG | TEMPERATURE: 99 F | DIASTOLIC BLOOD PRESSURE: 84 MMHG | OXYGEN SATURATION: 100 % | HEART RATE: 121 BPM | RESPIRATION RATE: 18 BRPM

## 2019-07-24 LAB
HCT VFR BLD CALC: 37.3 % — SIGNIFICANT CHANGE UP (ref 34.5–45)
HGB BLD-MCNC: 13.4 G/DL — SIGNIFICANT CHANGE UP (ref 11.5–15.5)
MCHC RBC-ENTMCNC: 27.6 PG — SIGNIFICANT CHANGE UP (ref 27–34)
MCHC RBC-ENTMCNC: 35.9 % — SIGNIFICANT CHANGE UP (ref 32–36)
MCV RBC AUTO: 76.7 FL — LOW (ref 80–100)
NRBC # FLD: 0.08 K/UL — SIGNIFICANT CHANGE UP (ref 0–0)
PLATELET # BLD AUTO: 299 K/UL — SIGNIFICANT CHANGE UP (ref 150–400)
PMV BLD: 11.1 FL — SIGNIFICANT CHANGE UP (ref 7–13)
RBC # BLD: 4.86 M/UL — SIGNIFICANT CHANGE UP (ref 3.8–5.2)
RBC # FLD: 16 % — HIGH (ref 10.3–14.5)
WBC # BLD: 10.84 K/UL — HIGH (ref 3.8–10.5)
WBC # FLD AUTO: 10.84 K/UL — HIGH (ref 3.8–10.5)

## 2019-07-24 RX ADMIN — Medication 600 MILLIGRAM(S): at 05:53

## 2019-07-24 RX ADMIN — SIMETHICONE 80 MILLIGRAM(S): 80 TABLET, CHEWABLE ORAL at 05:53

## 2019-07-24 RX ADMIN — HEPARIN SODIUM 5000 UNIT(S): 5000 INJECTION INTRAVENOUS; SUBCUTANEOUS at 05:53

## 2019-07-24 RX ADMIN — AMLODIPINE BESYLATE 10 MILLIGRAM(S): 2.5 TABLET ORAL at 05:53

## 2019-07-24 RX ADMIN — Medication 600 MILLIGRAM(S): at 06:25

## 2019-07-24 RX ADMIN — SODIUM CHLORIDE 3 MILLILITER(S): 9 INJECTION INTRAMUSCULAR; INTRAVENOUS; SUBCUTANEOUS at 05:46

## 2019-07-24 NOTE — PROGRESS NOTE ADULT - PROBLEM SELECTOR PLAN 1
Neuro: PO pain meds.   CV: tachycardia unchanged. Continue VS Q4hr   Pulm: Saturating well on room air, encourage oob/amb  GI: Continue regular diet  : Voiding spontanously. F/u final read from CT performed on 7/23  Heme: c/w HSQ and SCDs for DVT ppx  ID: Afebrile  Endo: No active issues   Dispo: Continue routine post-op care. discharge planning Neuro: PO pain meds.   CV: tachycardia unchanged. Continue VS Q4hr   Pulm: Saturating well on room air, encourage oob/amb  GI: Continue regular diet  : Voiding spontanously. F/u final read from CT performed on 7/23  Heme: c/w HSQ and SCDs for DVT ppx  ID: Afebrile  Endo: No active issues   Dispo: Continue routine post-op care. discharge planning pending CT

## 2019-07-24 NOTE — PROGRESS NOTE ADULT - REASON FOR ADMISSION
DAVIE, Bs, abdominoplasty, repair of umbilical hernia
DAVIE, BS, Abdominoplasty, repair of umbilical hernia
DAVIE, BS, abdominoplasty & repair of umbilical hernia
DAVIE, BS, abdominoplasty, repair of umbilical hernia
s/p Total Abdominal Hysterectomy BS, abdominoplasty & repair of umbilical hernia

## 2019-07-24 NOTE — PROGRESS NOTE ADULT - SUBJECTIVE AND OBJECTIVE BOX
R1 Progress Note POD#5    Patient seen and examined at bedside, no acute overnight events. No acute complaints, pain well controlled. Patient is ambulating, passing flatus, voiding spontaneously, and tolerating regular diet. Denies CP, SOB, N/V, fevers, and chills.    Vital Signs Last 24 Hours  T(C): 36.8 (07-24-19 @ 05:52), Max: 37.1 (07-23-19 @ 09:17)  HR: 106 (07-24-19 @ 05:52) (106 - 120)  BP: 133/86 (07-24-19 @ 05:52) (115/81 - 137/83)  RR: 18 (07-24-19 @ 05:52) (16 - 18)  SpO2: 97% (07-24-19 @ 05:52) (96% - 100%)    I&O's Detail    23 Jul 2019 07:01  -  24 Jul 2019 07:00  --------------------------------------------------------  IN:  Total IN: 0 mL    OUT:    Bulb: 27.5 mL    Bulb: 12.5 mL  Total OUT: 40 mL    Total NET: -40 mL          Physical Exam:  General: NAD  CV: NR, RR, S1, S2, no M/R/G  Lungs: CTA-B  Abdomen: Soft, non-tender, non-distended, +BS. R+L KATERYNA with serosanguineous drainage.   Incision: Vertical incision CDI  Ext: No pain or swelling    Labs:             12.6   16.86<H> )-----------( 201      ( 07-22 @ 12:39 )             35.6                12.9   15.39<H> )-----------( 201      ( 07-20 @ 06:00 )             36.5                13.5   18.46<H> )-----------( 212      ( 07-19 @ 22:55 )             38.2         MEDICATIONS  (STANDING):  amLODIPine   Tablet 10 milliGRAM(s) Oral daily  heparin  Injectable 5000 Unit(s) SubCutaneous every 12 hours  ibuprofen  Tablet. 600 milliGRAM(s) Oral every 6 hours  simethicone 80 milliGRAM(s) Chew two times a day  sodium chloride 0.9% lock flush 3 milliLiter(s) IV Push every 8 hours    MEDICATIONS  (PRN):  docusate sodium 100 milliGRAM(s) Oral three times a day PRN Stool Softening  HYDROmorphone   Tablet 2 milliGRAM(s) Oral every 4 hours PRN Severe Pain (7 - 10)  senna 2 Tablet(s) Oral at bedtime PRN Constipation R1 Progress Note POD#5    Patient seen and examined at bedside, no acute overnight events. No acute complaints. Patient reports her pain is much improved and she is ambulating in a more upright position without issue now. Patient is ambulating, passing flatus, voiding spontaneously, and tolerating regular diet. Denies CP, SOB, N/V, fevers, and chills.    Vital Signs Last 24 Hours  T(C): 36.8 (07-24-19 @ 05:52), Max: 37.1 (07-23-19 @ 09:17)  HR: 106 (07-24-19 @ 05:52) (106 - 120)  BP: 133/86 (07-24-19 @ 05:52) (115/81 - 137/83)  RR: 18 (07-24-19 @ 05:52) (16 - 18)  SpO2: 97% (07-24-19 @ 05:52) (96% - 100%)    I&O's Detail    23 Jul 2019 07:01  -  24 Jul 2019 07:00  --------------------------------------------------------  IN:  Total IN: 0 mL    OUT:    Bulb: 27.5 mL    Bulb: 12.5 mL  Total OUT: 40 mL    Total NET: -40 mL          Physical Exam:  General: NAD  CV: NR, RR, S1, S2, no M/R/G  Lungs: CTA-B  Abdomen: Soft, non-tender, non-distended, +BS. R+L KATERYNA with serosanguineous drainage.   Incision: Vertical incision CDI, no erythema, induration, or drainage  Ext: No pain or swelling    Labs:             12.6   16.86<H> )-----------( 201      ( 07-22 @ 12:39 )             35.6                12.9   15.39<H> )-----------( 201      ( 07-20 @ 06:00 )             36.5                13.5   18.46<H> )-----------( 212      ( 07-19 @ 22:55 )             38.2         MEDICATIONS  (STANDING):  amLODIPine   Tablet 10 milliGRAM(s) Oral daily  heparin  Injectable 5000 Unit(s) SubCutaneous every 12 hours  ibuprofen  Tablet. 600 milliGRAM(s) Oral every 6 hours  simethicone 80 milliGRAM(s) Chew two times a day  sodium chloride 0.9% lock flush 3 milliLiter(s) IV Push every 8 hours    MEDICATIONS  (PRN):  docusate sodium 100 milliGRAM(s) Oral three times a day PRN Stool Softening  HYDROmorphone   Tablet 2 milliGRAM(s) Oral every 4 hours PRN Severe Pain (7 - 10)  senna 2 Tablet(s) Oral at bedtime PRN Constipation

## 2019-07-24 NOTE — PROGRESS NOTE ADULT - ASSESSMENT
A/P: 49y POD#5 s/p DAVIE, BS, abdominoplasty, repair of umbilical hernia. She had a low grade fever which resolved after motrin. Patient at times tachycardic. She was previously worked up as outpatient by cardiology for tachycardia. Workup was negative. She is meeting all post-operative milestones. Patient had a CT scan yesterday demonstrating a peripherally enhancing collection in the cul-de-sac measuring 5.5 x 3.5 x 3.3 cm with small air collections noted - preliminary. Will discuss findings with . A/P: 49y POD#5 s/p DAVIE, BS, abdominoplasty, repair of umbilical hernia. She had a low grade fever which resolved after motrin. Patient at times tachycardic. She was previously worked up as outpatient by cardiology for tachycardia. Workup was negative. She is meeting all post-operative milestones. Patient had a CT scan yesterday demonstrating a peripherally enhancing collection in the cul-de-sac measuring 5.5 x 3.5 x 3.3 cm with small air collections noted - preliminary. Will discuss findings with .        Patient states pain completely resolved and exam benign. No overt signs of infection and wbc noted elevated but no change since testing. + large BM and now ambulating with more ease and no pain.   prelim report with collection and no ureteral injury  I d/w patient all implications Plan to discharge to home and follow up with Dr Vallejo and myself in 1 week   Instructions given when to call me or return to hospital - if any issues A/P: 49y POD#5 s/p DAVIE, BS, abdominoplasty, repair of umbilical hernia. She had a low grade fever which resolved after motrin. Patient at times tachycardic. She was previously worked up as outpatient by cardiology for tachycardia. Workup was negative. She is meeting all post-operative milestones. Patient had a CT scan yesterday demonstrating a peripherally enhancing collection in the cul-de-sac measuring 5.5 x 3.5 x 3.3 cm with small air collections noted - preliminary. Will discuss findings with .        Attending note   Patient states pain completely resolved and exam benign. No overt signs of infection and wbc noted elevated but no change since testing. + large BM and now ambulating with more ease and no pain.   prelim report with collection and no ureteral injury  I d/w patient all implications Plan to discharge to home and follow up with Dr Vallejo and myself in 1 week   Instructions given when to call me or return to hospital - if any issues MONICA Hummel

## 2019-07-24 NOTE — PROGRESS NOTE ADULT - PROVIDER SPECIALTY LIST ADULT
Anesthesia
GYN
Pain Medicine
Plastic Surgery
OB
GYN

## 2019-07-24 NOTE — PROGRESS NOTE ADULT - ASSESSMENT
50 y/o F s/p DAVIE, umbilical hernia repair, abdominoplasty on 7/19    - continue abdominal binder  - continue KATERYNA drains  - keep bed flexed  - may ambulate flexed slightly at waist  - dvt ppx  - pain control  - dispo per gyn  - Please follow up with Dr. Vallejo within x1 week after discharge from the hospital. You may call [phone] to schedule an appointment.      Plastic Surgery (pg [facility]: [id number], [facility]: [phone])

## 2019-07-24 NOTE — PROGRESS NOTE ADULT - SUBJECTIVE AND OBJECTIVE BOX
Plastic Surgery Progress Note (pg [facility]: [id number], [facility]: [phone])    SUBJECTIVE:  The patient was seen and examined. No acute events overnight. CT scan showing small fluid collection and air in pelvic cul-de-sac, postop changes.    OBJECTIVE:     ** VITAL SIGNS / I&O's **    Vital Signs Last 24 Hrs  T(C): 37.1 (24 Jul 2019 09:00), Max: 37.1 (24 Jul 2019 09:00)  T(F): 98.8 (24 Jul 2019 09:00), Max: 98.8 (24 Jul 2019 09:00)  HR: 131 (24 Jul 2019 10:00) (106 - 133)  BP: 134/84 (24 Jul 2019 09:00) (115/81 - 137/83)  BP(mean): --  RR: 18 (24 Jul 2019 09:00) (16 - 18)  SpO2: 96% (24 Jul 2019 09:00) (96% - 100%)      23 Jul 2019 07:01  -  24 Jul 2019 07:00  --------------------------------------------------------  IN:  Total IN: 0 mL    OUT:    Bulb: 27.5 mL    Bulb: 12.5 mL  Total OUT: 40 mL    Total NET: -40 mL          ** PHYSICAL EXAM **    -- CONSTITUTIONAL: Alert, NAD   -- ABDOMEN: soft, appropriately tender, incision c/d/i no collections appreciated, JPs serosang, umbo viable, area of tenderness at left flank, no sign of collection, erythema, or overlying skin changes    ** LABS **                          13.4   10.84 )-----------( 299      ( 24 Jul 2019 08:41 )             37.3             CAPILLARY BLOOD GLUCOSE              Recent Cultures:        ** MEDICATIONS **  MEDICATIONS  (STANDING):  amLODIPine   Tablet 10 milliGRAM(s) Oral daily  heparin  Injectable 5000 Unit(s) SubCutaneous every 12 hours  ibuprofen  Tablet. 600 milliGRAM(s) Oral every 6 hours  simethicone 80 milliGRAM(s) Chew two times a day  sodium chloride 0.9% lock flush 3 milliLiter(s) IV Push every 8 hours    MEDICATIONS  (PRN):  docusate sodium 100 milliGRAM(s) Oral three times a day PRN Stool Softening  HYDROmorphone   Tablet 2 milliGRAM(s) Oral every 4 hours PRN Severe Pain (7 - 10)  senna 2 Tablet(s) Oral at bedtime PRN Constipation

## 2019-07-26 LAB — SURGICAL PATHOLOGY STUDY: SIGNIFICANT CHANGE UP

## 2019-07-29 ENCOUNTER — INPATIENT (INPATIENT)
Facility: HOSPITAL | Age: 49
LOS: 1 days | Discharge: HOME | End: 2019-07-31
Attending: HOSPITALIST | Admitting: HOSPITALIST
Payer: COMMERCIAL

## 2019-07-29 VITALS
TEMPERATURE: 98 F | DIASTOLIC BLOOD PRESSURE: 90 MMHG | RESPIRATION RATE: 18 BRPM | SYSTOLIC BLOOD PRESSURE: 144 MMHG | OXYGEN SATURATION: 98 % | HEART RATE: 130 BPM

## 2019-07-29 DIAGNOSIS — Z98.890 OTHER SPECIFIED POSTPROCEDURAL STATES: Chronic | ICD-10-CM

## 2019-07-29 LAB
ALBUMIN SERPL ELPH-MCNC: 3.8 G/DL — SIGNIFICANT CHANGE UP (ref 3.3–5)
ALP SERPL-CCNC: 105 U/L — SIGNIFICANT CHANGE UP (ref 40–120)
ALT FLD-CCNC: 17 U/L — SIGNIFICANT CHANGE UP (ref 4–33)
ANION GAP SERPL CALC-SCNC: 14 MMO/L — SIGNIFICANT CHANGE UP (ref 7–14)
APTT BLD: 29.4 SEC — SIGNIFICANT CHANGE UP (ref 27.5–36.3)
AST SERPL-CCNC: 26 U/L — SIGNIFICANT CHANGE UP (ref 4–32)
BILIRUB SERPL-MCNC: 0.6 MG/DL — SIGNIFICANT CHANGE UP (ref 0.2–1.2)
BUN SERPL-MCNC: 10 MG/DL — SIGNIFICANT CHANGE UP (ref 7–23)
CALCIUM SERPL-MCNC: 9.9 MG/DL — SIGNIFICANT CHANGE UP (ref 8.4–10.5)
CHLORIDE SERPL-SCNC: 99 MMOL/L — SIGNIFICANT CHANGE UP (ref 98–107)
CO2 SERPL-SCNC: 24 MMOL/L — SIGNIFICANT CHANGE UP (ref 22–31)
CREAT SERPL-MCNC: 0.64 MG/DL — SIGNIFICANT CHANGE UP (ref 0.5–1.3)
D DIMER BLD IA.RAPID-MCNC: 3068 NG/ML — SIGNIFICANT CHANGE UP
GLUCOSE SERPL-MCNC: 104 MG/DL — HIGH (ref 70–99)
HCT VFR BLD CALC: 35.5 % — SIGNIFICANT CHANGE UP (ref 34.5–45)
HGB BLD-MCNC: 12.4 G/DL — SIGNIFICANT CHANGE UP (ref 11.5–15.5)
INR BLD: 1.11 — SIGNIFICANT CHANGE UP (ref 0.88–1.17)
MCHC RBC-ENTMCNC: 27.5 PG — SIGNIFICANT CHANGE UP (ref 27–34)
MCHC RBC-ENTMCNC: 34.9 % — SIGNIFICANT CHANGE UP (ref 32–36)
MCV RBC AUTO: 78.7 FL — LOW (ref 80–100)
NRBC # FLD: 0.03 K/UL — SIGNIFICANT CHANGE UP (ref 0–0)
PLATELET # BLD AUTO: 410 K/UL — HIGH (ref 150–400)
PMV BLD: 11.5 FL — SIGNIFICANT CHANGE UP (ref 7–13)
POTASSIUM SERPL-MCNC: 4.1 MMOL/L — SIGNIFICANT CHANGE UP (ref 3.5–5.3)
POTASSIUM SERPL-SCNC: 4.1 MMOL/L — SIGNIFICANT CHANGE UP (ref 3.5–5.3)
PROT SERPL-MCNC: 8.4 G/DL — HIGH (ref 6–8.3)
PROTHROM AB SERPL-ACNC: 12.4 SEC — SIGNIFICANT CHANGE UP (ref 9.8–13.1)
RBC # BLD: 4.51 M/UL — SIGNIFICANT CHANGE UP (ref 3.8–5.2)
RBC # FLD: 16.3 % — HIGH (ref 10.3–14.5)
SODIUM SERPL-SCNC: 137 MMOL/L — SIGNIFICANT CHANGE UP (ref 135–145)
WBC # BLD: 18.86 K/UL — HIGH (ref 3.8–10.5)
WBC # FLD AUTO: 18.86 K/UL — HIGH (ref 3.8–10.5)

## 2019-07-29 PROCEDURE — 71275 CT ANGIOGRAPHY CHEST: CPT | Mod: 26

## 2019-07-29 RX ORDER — HEPARIN SODIUM 5000 [USP'U]/ML
INJECTION INTRAVENOUS; SUBCUTANEOUS
Qty: 25000 | Refills: 0 | Status: DISCONTINUED | OUTPATIENT
Start: 2019-07-29 | End: 2019-07-30

## 2019-07-29 RX ORDER — HEPARIN SODIUM 5000 [USP'U]/ML
6500 INJECTION INTRAVENOUS; SUBCUTANEOUS EVERY 6 HOURS
Refills: 0 | Status: DISCONTINUED | OUTPATIENT
Start: 2019-07-29 | End: 2019-07-30

## 2019-07-29 RX ORDER — HEPARIN SODIUM 5000 [USP'U]/ML
6500 INJECTION INTRAVENOUS; SUBCUTANEOUS ONCE
Refills: 0 | Status: COMPLETED | OUTPATIENT
Start: 2019-07-29 | End: 2019-07-29

## 2019-07-29 RX ORDER — HEPARIN SODIUM 5000 [USP'U]/ML
3000 INJECTION INTRAVENOUS; SUBCUTANEOUS EVERY 6 HOURS
Refills: 0 | Status: DISCONTINUED | OUTPATIENT
Start: 2019-07-29 | End: 2019-07-30

## 2019-07-29 RX ADMIN — HEPARIN SODIUM 6500 UNIT(S): 5000 INJECTION INTRAVENOUS; SUBCUTANEOUS at 23:16

## 2019-07-29 RX ADMIN — HEPARIN SODIUM 1400 UNIT(S)/HR: 5000 INJECTION INTRAVENOUS; SUBCUTANEOUS at 23:18

## 2019-07-29 NOTE — ED ADULT NURSE NOTE - OBJECTIVE STATEMENT
received to room 18. complains of palpitations, right sided back pain, dry cough and sob x 3 days. saw pmd today who sent pt in for rule out PE. tachypneic on arrival to room. placed on 2LNC. states had total hysterectomy, hernia repair, and abdominoplasty on 7/19. incision clean, dry and intact. no signs of infection. umbilical incision filled with black drainage. not foul-smelling. states has looked this way since operation. labs sent. iv access established. await CTA with family at bedside.

## 2019-07-30 DIAGNOSIS — I26.99 OTHER PULMONARY EMBOLISM WITHOUT ACUTE COR PULMONALE: ICD-10-CM

## 2019-07-30 DIAGNOSIS — R00.0 TACHYCARDIA, UNSPECIFIED: ICD-10-CM

## 2019-07-30 DIAGNOSIS — I10 ESSENTIAL (PRIMARY) HYPERTENSION: ICD-10-CM

## 2019-07-30 PROBLEM — K42.9 UMBILICAL HERNIA WITHOUT OBSTRUCTION OR GANGRENE: Chronic | Status: ACTIVE | Noted: 2019-07-12

## 2019-07-30 LAB
ALBUMIN SERPL ELPH-MCNC: 3.8 G/DL — SIGNIFICANT CHANGE UP (ref 3.3–5)
ALP SERPL-CCNC: 107 U/L — SIGNIFICANT CHANGE UP (ref 40–120)
ALT FLD-CCNC: 15 U/L — SIGNIFICANT CHANGE UP (ref 4–33)
ANION GAP SERPL CALC-SCNC: 15 MMO/L — HIGH (ref 7–14)
APPEARANCE UR: CLEAR — SIGNIFICANT CHANGE UP
APTT BLD: 120.6 SEC — CRITICAL HIGH (ref 27.5–36.3)
APTT BLD: 43.9 SEC — HIGH (ref 27.5–36.3)
APTT BLD: 54.9 SEC — HIGH (ref 27.5–36.3)
AST SERPL-CCNC: 22 U/L — SIGNIFICANT CHANGE UP (ref 4–32)
BACTERIA # UR AUTO: NEGATIVE — SIGNIFICANT CHANGE UP
BASOPHILS # BLD AUTO: 0.05 K/UL — SIGNIFICANT CHANGE UP (ref 0–0.2)
BASOPHILS NFR BLD AUTO: 0.2 % — SIGNIFICANT CHANGE UP (ref 0–2)
BILIRUB SERPL-MCNC: 0.4 MG/DL — SIGNIFICANT CHANGE UP (ref 0.2–1.2)
BILIRUB UR-MCNC: NEGATIVE — SIGNIFICANT CHANGE UP
BLOOD UR QL VISUAL: NEGATIVE — SIGNIFICANT CHANGE UP
BUN SERPL-MCNC: 9 MG/DL — SIGNIFICANT CHANGE UP (ref 7–23)
CALCIUM SERPL-MCNC: 9.4 MG/DL — SIGNIFICANT CHANGE UP (ref 8.4–10.5)
CHLORIDE SERPL-SCNC: 101 MMOL/L — SIGNIFICANT CHANGE UP (ref 98–107)
CO2 SERPL-SCNC: 21 MMOL/L — LOW (ref 22–31)
COLOR SPEC: YELLOW — SIGNIFICANT CHANGE UP
CREAT SERPL-MCNC: 0.64 MG/DL — SIGNIFICANT CHANGE UP (ref 0.5–1.3)
EOSINOPHIL # BLD AUTO: 0.12 K/UL — SIGNIFICANT CHANGE UP (ref 0–0.5)
EOSINOPHIL NFR BLD AUTO: 0.6 % — SIGNIFICANT CHANGE UP (ref 0–6)
GLUCOSE SERPL-MCNC: 122 MG/DL — HIGH (ref 70–99)
GLUCOSE UR-MCNC: NEGATIVE — SIGNIFICANT CHANGE UP
HCT VFR BLD CALC: 31.6 % — LOW (ref 34.5–45)
HCT VFR BLD CALC: 33.1 % — LOW (ref 34.5–45)
HGB BLD-MCNC: 11.2 G/DL — LOW (ref 11.5–15.5)
HGB BLD-MCNC: 11.9 G/DL — SIGNIFICANT CHANGE UP (ref 11.5–15.5)
HYALINE CASTS # UR AUTO: NEGATIVE — SIGNIFICANT CHANGE UP
IMM GRANULOCYTES NFR BLD AUTO: 0.7 % — SIGNIFICANT CHANGE UP (ref 0–1.5)
INR BLD: 1.18 — HIGH (ref 0.88–1.17)
KETONES UR-MCNC: NEGATIVE — SIGNIFICANT CHANGE UP
LEUKOCYTE ESTERASE UR-ACNC: NEGATIVE — SIGNIFICANT CHANGE UP
LYMPHOCYTES # BLD AUTO: 1.88 K/UL — SIGNIFICANT CHANGE UP (ref 1–3.3)
LYMPHOCYTES # BLD AUTO: 8.8 % — LOW (ref 13–44)
MAGNESIUM SERPL-MCNC: 2.1 MG/DL — SIGNIFICANT CHANGE UP (ref 1.6–2.6)
MCHC RBC-ENTMCNC: 27.5 PG — SIGNIFICANT CHANGE UP (ref 27–34)
MCHC RBC-ENTMCNC: 28 PG — SIGNIFICANT CHANGE UP (ref 27–34)
MCHC RBC-ENTMCNC: 35.4 % — SIGNIFICANT CHANGE UP (ref 32–36)
MCHC RBC-ENTMCNC: 36 % — SIGNIFICANT CHANGE UP (ref 32–36)
MCV RBC AUTO: 77.5 FL — LOW (ref 80–100)
MCV RBC AUTO: 77.9 FL — LOW (ref 80–100)
MONOCYTES # BLD AUTO: 0.88 K/UL — SIGNIFICANT CHANGE UP (ref 0–0.9)
MONOCYTES NFR BLD AUTO: 4.1 % — SIGNIFICANT CHANGE UP (ref 2–14)
NEUTROPHILS # BLD AUTO: 18.23 K/UL — HIGH (ref 1.8–7.4)
NEUTROPHILS NFR BLD AUTO: 85.6 % — HIGH (ref 43–77)
NITRITE UR-MCNC: NEGATIVE — SIGNIFICANT CHANGE UP
NRBC # FLD: 0.03 K/UL — SIGNIFICANT CHANGE UP (ref 0–0)
NRBC # FLD: 0.04 K/UL — SIGNIFICANT CHANGE UP (ref 0–0)
PH UR: 7 — SIGNIFICANT CHANGE UP (ref 5–8)
PHOSPHATE SERPL-MCNC: 3.6 MG/DL — SIGNIFICANT CHANGE UP (ref 2.5–4.5)
PLATELET # BLD AUTO: 397 K/UL — SIGNIFICANT CHANGE UP (ref 150–400)
PLATELET # BLD AUTO: 414 K/UL — HIGH (ref 150–400)
PMV BLD: 10.6 FL — SIGNIFICANT CHANGE UP (ref 7–13)
PMV BLD: 11.1 FL — SIGNIFICANT CHANGE UP (ref 7–13)
POTASSIUM SERPL-MCNC: 4.3 MMOL/L — SIGNIFICANT CHANGE UP (ref 3.5–5.3)
POTASSIUM SERPL-SCNC: 4.3 MMOL/L — SIGNIFICANT CHANGE UP (ref 3.5–5.3)
PROT SERPL-MCNC: 7.6 G/DL — SIGNIFICANT CHANGE UP (ref 6–8.3)
PROT UR-MCNC: 30 — SIGNIFICANT CHANGE UP
PROTHROM AB SERPL-ACNC: 13.5 SEC — HIGH (ref 9.8–13.1)
RBC # BLD: 4.08 M/UL — SIGNIFICANT CHANGE UP (ref 3.8–5.2)
RBC # BLD: 4.25 M/UL — SIGNIFICANT CHANGE UP (ref 3.8–5.2)
RBC # FLD: 15.9 % — HIGH (ref 10.3–14.5)
RBC # FLD: 15.9 % — HIGH (ref 10.3–14.5)
RBC CASTS # UR COMP ASSIST: SIGNIFICANT CHANGE UP (ref 0–?)
SODIUM SERPL-SCNC: 137 MMOL/L — SIGNIFICANT CHANGE UP (ref 135–145)
SP GR SPEC: 1.02 — SIGNIFICANT CHANGE UP (ref 1–1.04)
SQUAMOUS # UR AUTO: SIGNIFICANT CHANGE UP
TSH SERPL-MCNC: 1.47 UIU/ML — SIGNIFICANT CHANGE UP (ref 0.27–4.2)
UROBILINOGEN FLD QL: NORMAL — SIGNIFICANT CHANGE UP
WBC # BLD: 21.31 K/UL — HIGH (ref 3.8–10.5)
WBC # BLD: 21.73 K/UL — HIGH (ref 3.8–10.5)
WBC # FLD AUTO: 21.31 K/UL — HIGH (ref 3.8–10.5)
WBC # FLD AUTO: 21.73 K/UL — HIGH (ref 3.8–10.5)
WBC UR QL: SIGNIFICANT CHANGE UP (ref 0–?)

## 2019-07-30 PROCEDURE — 12345: CPT | Mod: NC

## 2019-07-30 PROCEDURE — 93306 TTE W/DOPPLER COMPLETE: CPT | Mod: 26

## 2019-07-30 PROCEDURE — 93010 ELECTROCARDIOGRAM REPORT: CPT

## 2019-07-30 PROCEDURE — 99223 1ST HOSP IP/OBS HIGH 75: CPT

## 2019-07-30 RX ORDER — HEPARIN SODIUM 5000 [USP'U]/ML
6500 INJECTION INTRAVENOUS; SUBCUTANEOUS EVERY 6 HOURS
Refills: 0 | Status: DISCONTINUED | OUTPATIENT
Start: 2019-07-30 | End: 2019-07-30

## 2019-07-30 RX ORDER — GUAIFENESIN/DEXTROMETHORPHAN 600MG-30MG
100 TABLET, EXTENDED RELEASE 12 HR ORAL ONCE
Refills: 0 | Status: COMPLETED | OUTPATIENT
Start: 2019-07-30 | End: 2019-07-30

## 2019-07-30 RX ORDER — HEPARIN SODIUM 5000 [USP'U]/ML
3000 INJECTION INTRAVENOUS; SUBCUTANEOUS EVERY 6 HOURS
Refills: 0 | Status: DISCONTINUED | OUTPATIENT
Start: 2019-07-30 | End: 2019-07-30

## 2019-07-30 RX ORDER — APIXABAN 2.5 MG/1
1 TABLET, FILM COATED ORAL
Qty: 1 | Refills: 0
Start: 2019-07-30 | End: 2019-08-28

## 2019-07-30 RX ORDER — HEPARIN SODIUM 5000 [USP'U]/ML
INJECTION INTRAVENOUS; SUBCUTANEOUS
Qty: 25000 | Refills: 0 | Status: DISCONTINUED | OUTPATIENT
Start: 2019-07-30 | End: 2019-07-30

## 2019-07-30 RX ORDER — APIXABAN 2.5 MG/1
10 TABLET, FILM COATED ORAL EVERY 12 HOURS
Refills: 0 | Status: DISCONTINUED | OUTPATIENT
Start: 2019-07-30 | End: 2019-07-31

## 2019-07-30 RX ORDER — ACETAMINOPHEN 500 MG
650 TABLET ORAL EVERY 6 HOURS
Refills: 0 | Status: DISCONTINUED | OUTPATIENT
Start: 2019-07-30 | End: 2019-07-31

## 2019-07-30 RX ORDER — SIMETHICONE 80 MG/1
80 TABLET, CHEWABLE ORAL ONCE
Refills: 0 | Status: COMPLETED | OUTPATIENT
Start: 2019-07-30 | End: 2019-07-30

## 2019-07-30 RX ORDER — PREGABALIN 225 MG/1
1 CAPSULE ORAL
Qty: 0 | Refills: 0 | DISCHARGE

## 2019-07-30 RX ORDER — SENNA PLUS 8.6 MG/1
1 TABLET ORAL DAILY
Refills: 0 | Status: DISCONTINUED | OUTPATIENT
Start: 2019-07-30 | End: 2019-07-31

## 2019-07-30 RX ORDER — HYDROMORPHONE HYDROCHLORIDE 2 MG/ML
2 INJECTION INTRAMUSCULAR; INTRAVENOUS; SUBCUTANEOUS EVERY 4 HOURS
Refills: 0 | Status: DISCONTINUED | OUTPATIENT
Start: 2019-07-30 | End: 2019-07-31

## 2019-07-30 RX ORDER — FAMOTIDINE 10 MG/ML
20 INJECTION INTRAVENOUS ONCE
Refills: 0 | Status: COMPLETED | OUTPATIENT
Start: 2019-07-30 | End: 2019-07-30

## 2019-07-30 RX ORDER — DOCUSATE SODIUM 100 MG
100 CAPSULE ORAL DAILY
Refills: 0 | Status: DISCONTINUED | OUTPATIENT
Start: 2019-07-30 | End: 2019-07-31

## 2019-07-30 RX ADMIN — Medication 100 MILLIGRAM(S): at 19:09

## 2019-07-30 RX ADMIN — Medication 100 MILLILITER(S): at 03:36

## 2019-07-30 RX ADMIN — SIMETHICONE 80 MILLIGRAM(S): 80 TABLET, CHEWABLE ORAL at 21:25

## 2019-07-30 RX ADMIN — HEPARIN SODIUM 1600 UNIT(S)/HR: 5000 INJECTION INTRAVENOUS; SUBCUTANEOUS at 06:33

## 2019-07-30 RX ADMIN — Medication 30 MILLILITER(S): at 00:24

## 2019-07-30 RX ADMIN — HEPARIN SODIUM 1600 UNIT(S)/HR: 5000 INJECTION INTRAVENOUS; SUBCUTANEOUS at 15:31

## 2019-07-30 RX ADMIN — APIXABAN 10 MILLIGRAM(S): 2.5 TABLET, FILM COATED ORAL at 21:16

## 2019-07-30 RX ADMIN — FAMOTIDINE 20 MILLIGRAM(S): 10 INJECTION INTRAVENOUS at 00:24

## 2019-07-30 RX ADMIN — SENNA PLUS 1 TABLET(S): 8.6 TABLET ORAL at 19:09

## 2019-07-30 RX ADMIN — Medication 100 MILLIGRAM(S): at 22:53

## 2019-07-30 RX ADMIN — HEPARIN SODIUM 1400 UNIT(S)/HR: 5000 INJECTION INTRAVENOUS; SUBCUTANEOUS at 09:16

## 2019-07-30 RX ADMIN — HEPARIN SODIUM 3000 UNIT(S): 5000 INJECTION INTRAVENOUS; SUBCUTANEOUS at 06:37

## 2019-07-30 NOTE — ED PROVIDER NOTE - PHYSICAL EXAMINATION
Gen: NAD  Head: NCAT  HEENT: PERRL, MMM, normal conjunctiva, anicteric, neck supple  Lung: CTAB, no adventitious sounds  CV: RRR, no murmurs, rubs or gallops  Abd: soft, NTND, no rebound or guarding, no CVAT  MSK: No edema, no visible deformities  Neuro: No focal neurologic deficits. CN II-XII grossly intact. 5/5 strength and normal sensation in all extremities.  Skin: Warm and dry, no evidence of rash  Psych: normal mood and affect Gen: NAD  Head: NCAT  HEENT: PERRL, MMM, normal conjunctiva, anicteric, neck supple  Lung: CTAB, no adventitious sounds  CV: RRR, no murmurs, rubs or gallops  Abd: Surgical site C/D/I. soft, NTND, no rebound or guarding, no CVAT  MSK: No edema, no visible deformities  Neuro: No focal neurologic deficits. CN II-XII grossly intact. 5/5 strength and normal sensation in all extremities.  Skin: Warm and dry, no evidence of rash  Psych: normal mood and affect

## 2019-07-30 NOTE — CHART NOTE - NSCHARTNOTEFT_GEN_A_CORE
Informed by RN patient reports some abdominal bloating. Went to go see patient at bedside. Patient sitting comfortably in her bed. Patient denies abdominal pain. Patient reports last bowel movement was 2 days ago. Patient reports some bloating. On exam, bowel sounds present in all quadrants, no tenderness upon palpation of abdomen, mild distention of abdomen on exam. Patient with recent umbilical hernia repair. Surgery site with scab over umbilicus. Surgery site without erythema, drainage, warmth, or tenderness. Ordered colace, senna, and simethicone for symptoms. Team will monitor for improvement in symptoms and abdominal exam. Sign out given to night NP. Medical attending made aware.

## 2019-07-30 NOTE — PROVIDER CONTACT NOTE (OTHER) - ACTION/TREATMENT ORDERED:
no need for CBC, pt to be converted to eliquis
NO FURTHER INTERVENTION ORDERED AT THIS TIME WILL CONTINUE TO MONITOR.

## 2019-07-30 NOTE — ED PROVIDER NOTE - NS ED ROS FT
ROS: denies HA, weakness, dizziness, fevers/chills, nausea/vomiting, diaphoresis, abdominal pain, diarrhea, joint pain, neuro deficits, dysuria/hematuria, rash    +SOB/CP

## 2019-07-30 NOTE — ED PROVIDER NOTE - CRITICAL CARE INDICATION, MLM
Patient was critically ill with a high probability of imminent or life threatening deterioration.  Patient with bilateral pulmonary embolisms. patient was critically ill...

## 2019-07-30 NOTE — H&P ADULT - PROBLEM SELECTOR PLAN 2
patient baseline 120s, unclear why, being w/u as outpt, now 130, likely 2/2 acute PE, monitor for now

## 2019-07-30 NOTE — PROVIDER CONTACT NOTE (CRITICAL VALUE NOTIFICATION) - ACTION/TREATMENT ORDERED:
notified TIN jo,she d/gianna the present nomogram and stated will order new nomogram,passed to day RN

## 2019-07-30 NOTE — H&P ADULT - ASSESSMENT
Patient is a 48 y/o F PMH Uterine Fibroids s/p abdominoplasty, repair umbilical hernia and DAVIE on 7-19-19 p/w PE

## 2019-07-30 NOTE — PROVIDER CONTACT NOTE (CRITICAL VALUE NOTIFICATION) - SITUATION
noted pt has aptt-120.6,pt was on heparin drip,dose was adjusted at 5am as per protocol,at 0710 am other nurse saw a blood slip and send blood again

## 2019-07-30 NOTE — ED PROVIDER NOTE - ATTENDING CONTRIBUTION TO CARE
Patient presenting 10 days post hysterectomy w/SOB and found to have tachycardia.     CTPE  Labs  Admission     HERNANDEZ Brownlee: I have personally performed a face to face bedside history and physical examination of this patient. I have discussed the history, examination, review of systems, assessment and plan of management with the resident. I have reviewed the electronic medical record and amended it to reflect my history, review of systems, physical exam, assessment and plan.

## 2019-07-30 NOTE — H&P ADULT - HISTORY OF PRESENT ILLNESS
Patient is a 48 y/o F PMH Uterine Fibroids s/p abdominoplasty, repair umbilical hernia and DAVIE on 7-19-19 p/w SOB. Patient noted RLE swelling, with an area of hardness and pain towards the end of her hospitalization. Pain and swelling resolved, then patient began to have increased cough about 4 days ago w/ R sided pleuritic chest pain. Was evaluated by her PCP, was noted to have tachycardia to 130, recommended patient go to ER to eval for PE. Patient then had acute onset of SOB and came to ER.  Patient reports having chronic tachycardia, unclear etiology and for how long. Was evaluated by PCP/cardiologist for pre-op clearance, tachycardia was noted up to 120, underwent stress and TTE, both unremarkable, with plans to further w/u tachycardia as outpt once post-op. Patient reports that she had persistent tachycardia post-op which kept her in the hospital for a few more days. HR noted to be up to 120s, consistent with outpatient records.

## 2019-07-30 NOTE — PROVIDER CONTACT NOTE (OTHER) - ASSESSMENT
Received patient   palpated /88 manual oxygen 95RA pt reports abdominal firmness. Endorses flatus today, now bowel movement, normoactive bowel sounds. No drainage from umbilical/surg site. CBC not drawn at 2pm
PT IS ALERT AND ORIENTED X4M DENIES PAIN. NO DISTRESS NOTED

## 2019-07-30 NOTE — H&P ADULT - NSHPREVIEWOFSYSTEMS_GEN_ALL_CORE
Constitutional Symptoms: No weakness, fevers, chills, weight loss  Eyes: No visual changes, headache, eye pain, double vision  Ears, Nose, Mouth, Throat: No runny nose, sinus pain, ear pain, tinnitus, sore throat, dysphagia, odynophagia  Cardiovascular: +palpitations, R sided pleuritic chest pain, no edema  Respiratory: +SOB, cough  Gastrointestinal: +abdominal pain, no dysphagia, anorexia, nausea/vomiting, diarrhea/constipation, hematemesis, BRBPR, melena  Genitourinary: No dysuria, frequency, hematuria  Musculoskeletal: No joint pain, joint swelling, decreased ROM  Skin: No pruritus, rashes, lesions, wounds  Neurologic:  No seizures, headache, paraesthesia, numbness, limb weakness    Positives and pertinent negatives noted and all other systems negative.

## 2019-07-30 NOTE — ED PROVIDER NOTE - OBJECTIVE STATEMENT
50yo F with hx of hysterectomy 10 days ago presents with sob sudden onset since last night. sent in by OB for r/o PE 50yo F with hx of hysterectomy 10 days ago presents with sob sudden onset since last night. sent in by OB for r/o PE. Patient states that she was in her usual state of health up until approx 4 days prior when she noted increased cough and fast heart rate. Patient denies any active chest pain or SOB at rest.

## 2019-07-30 NOTE — H&P ADULT - NSHPPHYSICALEXAM_GEN_ALL_CORE
T(C): 37.1 (07-30-19 @ 02:56), Max: 37.1 (07-30-19 @ 02:56)  HR: 132 (07-30-19 @ 02:56) (121 - 132)  BP: 145/96 (07-30-19 @ 02:56) (137/95 - 145/96)  RR: 18 (07-30-19 @ 02:56) (16 - 20)  SpO2: 98% (07-30-19 @ 02:56) (98% - 99%)    Constitutional: NAD, well-developed, well-nourished  Ears, Nose, Mouth, and Throat: normal external ears and nose, normal hearing, moist oral mucosa  Eyes: normal conjunctiva, EOMI, PERRL  Neck: supple, no JVD  Respiratory: Clear to auscultation bilaterally. No wheezes, rales or rhonchi. Normal respiratory effort  Cardiovascular: tachycardia, no M/R/G, no edema, 2+ Peripheral Pulses  Gastrointestinal: soft, nontender, nondistended, +BS, no hernia  Skin: warm, dry, no rash  Neurologic: sensation grossly intact, CN grossly intact, non-focal exam  Musculoskeletal: no clubbing, no cyanosis, no joint swelling  Psychiatric: AOX3, appropriate mood, affect

## 2019-07-30 NOTE — H&P ADULT - NSHPLABSRESULTS_GEN_ALL_CORE
07-29    137  |  99  |  10  ----------------------------<  104<H>  4.1   |  24  |  0.64    Ca    9.9      29 Jul 2019 17:40    TPro  8.4<H>  /  Alb  3.8  /  TBili  0.6  /  DBili  x   /  AST  26  /  ALT  17  /  AlkPhos  105  07-29                            12.4   18.86 )-----------( 410      ( 29 Jul 2019 17:40 )             35.5             LIVER FUNCTIONS - ( 29 Jul 2019 17:40 )  Alb: 3.8 g/dL / Pro: 8.4 g/dL / ALK PHOS: 105 u/L / ALT: 17 u/L / AST: 26 u/L / GGT: x             PT/INR - ( 29 Jul 2019 17:40 )   PT: 12.4 SEC;   INR: 1.11          PTT - ( 29 Jul 2019 17:40 )  PTT:29.4 SEC      EKG personally reviewed, Sinus tachycardia

## 2019-07-30 NOTE — CHART NOTE - NSCHARTNOTEFT_GEN_A_CORE
saw and examined pt  feels well, SOB improved  triggered PE post op on heparin gtt  will check if DOAC is covered, check TTE rule out RHS. leukocytosis likely reactive to PE and pulmonary infarct. check UA and bcx and monitor off abx for now   constipation - add bowel regimen saw and examined pt  feels well, SOB improved  triggered PE post op on heparin gtt  will check if DOAC is covered, check TTE rule out RHS. leukocytosis likely reactive to PE and pulmonary infarct. check UA and bcx and monitor off abx for now   constipation - add bowel regimen  pls see H&P for full details

## 2019-07-31 ENCOUNTER — TRANSCRIPTION ENCOUNTER (OUTPATIENT)
Age: 49
End: 2019-07-31

## 2019-07-31 VITALS
SYSTOLIC BLOOD PRESSURE: 130 MMHG | RESPIRATION RATE: 18 BRPM | OXYGEN SATURATION: 95 % | TEMPERATURE: 99 F | HEART RATE: 123 BPM | DIASTOLIC BLOOD PRESSURE: 78 MMHG

## 2019-07-31 DIAGNOSIS — Z29.9 ENCOUNTER FOR PROPHYLACTIC MEASURES, UNSPECIFIED: ICD-10-CM

## 2019-07-31 LAB
ANION GAP SERPL CALC-SCNC: 13 MMO/L — SIGNIFICANT CHANGE UP (ref 7–14)
BASOPHILS # BLD AUTO: 0.04 K/UL — SIGNIFICANT CHANGE UP (ref 0–0.2)
BASOPHILS NFR BLD AUTO: 0.3 % — SIGNIFICANT CHANGE UP (ref 0–2)
BUN SERPL-MCNC: 10 MG/DL — SIGNIFICANT CHANGE UP (ref 7–23)
CALCIUM SERPL-MCNC: 9.6 MG/DL — SIGNIFICANT CHANGE UP (ref 8.4–10.5)
CHLORIDE SERPL-SCNC: 101 MMOL/L — SIGNIFICANT CHANGE UP (ref 98–107)
CO2 SERPL-SCNC: 24 MMOL/L — SIGNIFICANT CHANGE UP (ref 22–31)
CREAT SERPL-MCNC: 0.6 MG/DL — SIGNIFICANT CHANGE UP (ref 0.5–1.3)
EOSINOPHIL # BLD AUTO: 0.18 K/UL — SIGNIFICANT CHANGE UP (ref 0–0.5)
EOSINOPHIL NFR BLD AUTO: 1.1 % — SIGNIFICANT CHANGE UP (ref 0–6)
GLUCOSE SERPL-MCNC: 106 MG/DL — HIGH (ref 70–99)
HCT VFR BLD CALC: 32.7 % — LOW (ref 34.5–45)
HCT VFR BLD CALC: 32.7 % — LOW (ref 34.5–45)
HGB BLD-MCNC: 11.7 G/DL — SIGNIFICANT CHANGE UP (ref 11.5–15.5)
HGB BLD-MCNC: 11.7 G/DL — SIGNIFICANT CHANGE UP (ref 11.5–15.5)
IMM GRANULOCYTES NFR BLD AUTO: 0.8 % — SIGNIFICANT CHANGE UP (ref 0–1.5)
LYMPHOCYTES # BLD AUTO: 1.55 K/UL — SIGNIFICANT CHANGE UP (ref 1–3.3)
LYMPHOCYTES # BLD AUTO: 9.7 % — LOW (ref 13–44)
MAGNESIUM SERPL-MCNC: 2.1 MG/DL — SIGNIFICANT CHANGE UP (ref 1.6–2.6)
MCHC RBC-ENTMCNC: 28.1 PG — SIGNIFICANT CHANGE UP (ref 27–34)
MCHC RBC-ENTMCNC: 28.1 PG — SIGNIFICANT CHANGE UP (ref 27–34)
MCHC RBC-ENTMCNC: 35.8 % — SIGNIFICANT CHANGE UP (ref 32–36)
MCHC RBC-ENTMCNC: 35.8 % — SIGNIFICANT CHANGE UP (ref 32–36)
MCV RBC AUTO: 78.4 FL — LOW (ref 80–100)
MCV RBC AUTO: 78.4 FL — LOW (ref 80–100)
MONOCYTES # BLD AUTO: 0.78 K/UL — SIGNIFICANT CHANGE UP (ref 0–0.9)
MONOCYTES NFR BLD AUTO: 4.9 % — SIGNIFICANT CHANGE UP (ref 2–14)
NEUTROPHILS # BLD AUTO: 13.26 K/UL — HIGH (ref 1.8–7.4)
NEUTROPHILS NFR BLD AUTO: 83.2 % — HIGH (ref 43–77)
NRBC # FLD: 0.02 K/UL — SIGNIFICANT CHANGE UP (ref 0–0)
NRBC # FLD: 0.02 K/UL — SIGNIFICANT CHANGE UP (ref 0–0)
PHOSPHATE SERPL-MCNC: 4.3 MG/DL — SIGNIFICANT CHANGE UP (ref 2.5–4.5)
PLATELET # BLD AUTO: 419 K/UL — HIGH (ref 150–400)
PLATELET # BLD AUTO: 419 K/UL — HIGH (ref 150–400)
PMV BLD: 11.2 FL — SIGNIFICANT CHANGE UP (ref 7–13)
PMV BLD: 11.2 FL — SIGNIFICANT CHANGE UP (ref 7–13)
POTASSIUM SERPL-MCNC: 3.6 MMOL/L — SIGNIFICANT CHANGE UP (ref 3.5–5.3)
POTASSIUM SERPL-SCNC: 3.6 MMOL/L — SIGNIFICANT CHANGE UP (ref 3.5–5.3)
RBC # BLD: 4.17 M/UL — SIGNIFICANT CHANGE UP (ref 3.8–5.2)
RBC # BLD: 4.17 M/UL — SIGNIFICANT CHANGE UP (ref 3.8–5.2)
RBC # FLD: 15.9 % — HIGH (ref 10.3–14.5)
RBC # FLD: 15.9 % — HIGH (ref 10.3–14.5)
SODIUM SERPL-SCNC: 138 MMOL/L — SIGNIFICANT CHANGE UP (ref 135–145)
SPECIMEN SOURCE: SIGNIFICANT CHANGE UP
WBC # BLD: 15.93 K/UL — HIGH (ref 3.8–10.5)
WBC # BLD: 15.93 K/UL — HIGH (ref 3.8–10.5)
WBC # FLD AUTO: 15.93 K/UL — HIGH (ref 3.8–10.5)
WBC # FLD AUTO: 15.93 K/UL — HIGH (ref 3.8–10.5)

## 2019-07-31 PROCEDURE — 99239 HOSP IP/OBS DSCHRG MGMT >30: CPT

## 2019-07-31 RX ORDER — SENNA PLUS 8.6 MG/1
1 TABLET ORAL
Qty: 0 | Refills: 0 | DISCHARGE
Start: 2019-07-31

## 2019-07-31 RX ORDER — AMLODIPINE BESYLATE 2.5 MG/1
10 TABLET ORAL DAILY
Refills: 0 | Status: DISCONTINUED | OUTPATIENT
Start: 2019-07-31 | End: 2019-07-31

## 2019-07-31 RX ORDER — AMLODIPINE BESYLATE 2.5 MG/1
1 TABLET ORAL
Qty: 0 | Refills: 0 | DISCHARGE
Start: 2019-07-31

## 2019-07-31 RX ORDER — DOCUSATE SODIUM 100 MG
1 CAPSULE ORAL
Qty: 0 | Refills: 0 | DISCHARGE
Start: 2019-07-31

## 2019-07-31 RX ORDER — AMLODIPINE BESYLATE 2.5 MG/1
1 TABLET ORAL
Qty: 0 | Refills: 0 | DISCHARGE

## 2019-07-31 RX ADMIN — APIXABAN 10 MILLIGRAM(S): 2.5 TABLET, FILM COATED ORAL at 18:21

## 2019-07-31 RX ADMIN — AMLODIPINE BESYLATE 10 MILLIGRAM(S): 2.5 TABLET ORAL at 07:38

## 2019-07-31 RX ADMIN — APIXABAN 10 MILLIGRAM(S): 2.5 TABLET, FILM COATED ORAL at 08:33

## 2019-07-31 RX ADMIN — SENNA PLUS 1 TABLET(S): 8.6 TABLET ORAL at 07:38

## 2019-07-31 NOTE — PHYSICAL THERAPY INITIAL EVALUATION ADULT - GENERAL OBSERVATIONS, REHAB EVAL
Patient received seated in bedside chair,  present, in no apparent distress. ok for physical therapy evaluation as per LISSA Ellington. Patient agreeable to participate in evaluation.

## 2019-07-31 NOTE — DISCHARGE NOTE PROVIDER - HOSPITAL COURSE
Patient is a 48 y/o F PMH Uterine Fibroids s/p abdominoplasty, repair umbilical hernia and DAVIE on 7-19-19 p/w acute b/l PE        Pulmonary embolism:    - triggered, post op    - transitioned from heparin gtt to Eliquis 7/31/19 - pt aware of co-pay and deductible and is opting for DOAC over coumadin as she does not want INR monitoring and dietary limitations.     saturating well on RA - TTE with RHS    - leukocytosis reactive to PE and lung infract, now resolving off abx. UA neg.         Sinus tachycardia    - patient baseline 120s, unclear why, being w/u as outpt, now overall improved.         Essential hypertension    - Norvasc held on admission due to acute PE    - resumed 7/30/19        Discussed discharge medications with medical attending, Amlodipine resumed 7/30/19 patient to continue as outpatient     As per medical attending patient is medically stable for discharge to home     Dispo: home no needs

## 2019-07-31 NOTE — PHYSICAL THERAPY INITIAL EVALUATION ADULT - PERTINENT HX OF CURRENT PROBLEM, REHAB EVAL
Patient is a 49 year old female with a past medical history significant for uterine Fibroids s/p abdominoplasty, repair umbilical hernia and DAVIE (7/19) presenting with PE

## 2019-07-31 NOTE — PHYSICAL THERAPY INITIAL EVALUATION ADULT - ADDITIONAL COMMENTS
Prior to recent surgery (7/19/19), patient reports she was independent. Patient lives in a two story house with her  and kids with one step to enter. Patient reports following her last admission she had been staying in the first floor bedroom.   CT: Acute bilateral pulmonary emboli in bilateral upper lobes, right middle lobe and bilateral lower lobes. Nonenhancing right lower lobe airspace consolidation concerning for pulmonary infarct. Partial bilateral lower lobe atelectasis. No CT evidence of right heart strain, however correlate with echocardiography.  Patient currently on heparin, Ok for physical therapy evaluation as per LISSA Ellington.  Patient left as found in bedside chair, in no apparent distress,  present and LISSA Ellington aware.

## 2019-07-31 NOTE — PROGRESS NOTE ADULT - ASSESSMENT
Patient is a 48 y/o F PMH Uterine Fibroids s/p abdominoplasty, repair umbilical hernia and DAVIE on 7-19-19 p/w acute b/l PE

## 2019-07-31 NOTE — DISCHARGE NOTE NURSING/CASE MANAGEMENT/SOCIAL WORK - NSDCDPATPORTLINK_GEN_ALL_CORE
You can access the [facility] Patient Portal, offered by [facility], by registering with the following website: [email or web address]

## 2019-07-31 NOTE — DISCHARGE NOTE PROVIDER - NSDCCPCAREPLAN_GEN_ALL_CORE_FT
PRINCIPAL DISCHARGE DIAGNOSIS  Diagnosis: PE (pulmonary thromboembolism)  Assessment and Plan of Treatment: A CT Angiogram of your chest was obtained and you were found to have pulmonary embolism (clot in your lungs). You were started on a heparin drip which was later stopped and Eliquis was started. Please take Eliquis 10 mg PO BID x 7 days then decrease to 5 mg PO BID. You will need to be on Eliquis for several months. Please follow up with your cardiologist who will be taking over prescribing you Eliquis once you are discharged from the hospital. Please follow up with your caridologist within 1 week of discharge from the hospital.  Continue your medications as directed and please follow-up as an outpatient with your primary care provider for further care and recommendations.      SECONDARY DISCHARGE DIAGNOSES  Diagnosis: Sinus tachycardia  Assessment and Plan of Treatment: Your heart rate was found to be elevated during your hospital stay (you report elevated heart rate at baseline as well). Please follow up with your cardiologist within 1 week of discharge from the hospital.   Continue your medications as directed and please follow-up as an outpatient with your primary care provider for further care and recommendations.    Diagnosis: Essential hypertension  Assessment and Plan of Treatment: Continue blood pressure medication regimen as directed. Monitor for any visual changes, headaches or dizziness.  Monitor blood pressure regularly.  Follow up with your PCP for further management for high blood pressure.

## 2019-07-31 NOTE — DISCHARGE NOTE PROVIDER - CARE PROVIDER_API CALL
Frankel, Perry A (MD)  Internal Medicine  [address] [place] [address], [address]  [place] [address]  Phone: [phone]  Fax: [phone]  Follow Up Time:     Cardiologist,   Phone: (   )    -  Fax: (   )    -  Follow Up Time:

## 2019-07-31 NOTE — PROGRESS NOTE ADULT - PROBLEM SELECTOR PLAN 3
Norvasc held on admission due to acute PE  BP overall well controlled, would sent michelle off anti-hypertensive with out-pt monitoring

## 2019-07-31 NOTE — DISCHARGE NOTE NURSING/CASE MANAGEMENT/SOCIAL WORK - NSDCFUADDAPPT_GEN_ALL_CORE_FT
Please follow up with your cardiologist within 1 week of discharge from the hospital. If you do not have a cardiologist you can call the [facility] cardiologist clinic to make an appointment: [phone].    Please follow up with your primary care provider within 1 week of discharge from the hospital.

## 2019-07-31 NOTE — PHYSICAL THERAPY INITIAL EVALUATION ADULT - MANUAL MUSCLE TESTING RESULTS, REHAB EVAL
Bilateral knee extensor/flexor strength 4/5. Bilateral ankle dorsiflexion/plantarflexion 5/5. bilateral upper extremities no strength deficits identified.

## 2019-07-31 NOTE — DISCHARGE NOTE PROVIDER - NSDCFUADDAPPT_GEN_ALL_CORE_FT
Please follow up with your cardiologist within 1 week of discharge from the hospital. If you do not have a cardiologist you can call the [facility] cardiologist clinic to make an appointment: [phone].    Please follow up with your primary care provider within 1 week of discharge from the hospital.

## 2019-07-31 NOTE — PROGRESS NOTE ADULT - SUBJECTIVE AND OBJECTIVE BOX
Patient is a 49y old  Female who presents with a chief complaint of SOB (2019 03:02)      SUBJECTIVE / OVERNIGHT EVENTS: no evnets, feels well, denies CP, SOB, palpitations. remains afebrile. esthela AC well, no overt bleed     ROS:  No HA/DZ  No Vision changes   No CP, SOB  No N/V/D  No Edema  No Rash  NO weakness, numbness    MEDICATIONS  (STANDING):  amLODIPine   Tablet 10 milliGRAM(s) Oral daily  apixaban 10 milliGRAM(s) Oral every 12 hours    MEDICATIONS  (PRN):  acetaminophen   Tablet .. 650 milliGRAM(s) Oral every 6 hours PRN Mild Pain (1 - 3), Moderate Pain (4 - 6)  docusate sodium 100 milliGRAM(s) Oral daily PRN Constipation  HYDROmorphone   Tablet 2 milliGRAM(s) Oral every 4 hours PRN mod-severe pain  senna 1 Tablet(s) Oral daily PRN Constipation      T(C): 36.8 (19 @ 06:50)  HR: 102 (19 @ 07:35)  BP: 128/86 (19 @ 07:35)  RR: 18 (19 @ 07:35)  SpO2: 99% (19 @ 07:35)  CAPILLARY BLOOD GLUCOSE        I&O's Summary      PHYSICAL EXAM:  GENERAL: NAD, well-developed, AOx3  HEAD:  Atraumatic, Normocephalic  EYES: EOMI, PERRL, conjunctiva and sclera clear  NECK: Supple, No JVD  CHEST/LUNG: Clear to auscultation bilaterally  HEART: Regular rate and rhythm; No murmurs, rubs, or gallops, No Edema  ABDOMEN: Soft, Nontender, Nondistended; Bowel sounds present  EXTREMITIES:  2+ Peripheral Pulses, No clubbing, cyanosis  PSYCH: No SI/HI  NEUROLOGY: non-focal  SKIN: Dressing c/d/i     LABS:                        11.7   15.93 )-----------( 419      ( 2019 05:36 )             32.7         138  |  101  |  10  ----------------------------<  106<H>  3.6   |  24  |  0.60    Ca    9.6      2019 05:36  Phos  4.3       Mg     2.1         TPro  7.6  /  Alb  3.8  /  TBili  0.4  /  DBili  x   /  AST  22  /  ALT  15  /  AlkPhos  107      PT/INR - ( 2019 04:50 )   PT: 13.5 SEC;   INR: 1.18          PTT - ( 2019 13:15 )  PTT:43.9 SEC      Urinalysis Basic - ( 2019 18:10 )    Color: YELLOW / Appearance: CLEAR / S.025 / pH: 7.0  Gluc: NEGATIVE / Ketone: NEGATIVE  / Bili: NEGATIVE / Urobili: NORMAL   Blood: NEGATIVE / Protein: 30 / Nitrite: NEGATIVE   Leuk Esterase: NEGATIVE / RBC: 0-2 / WBC 0-2   Sq Epi: OCC / Non Sq Epi: x / Bacteria: NEGATIVE            RADIOLOGY & ADDITIONAL TESTS:    Imaging Personally Reviewed:    Consultant(s) Notes Reviewed:      Care Discussed with Consultants/Other Providers:

## 2019-07-31 NOTE — PROGRESS NOTE ADULT - PROBLEM SELECTOR PLAN 1
triggered, post op  transitioned from heparin gtt to Eliquis yesterday - pt aware of co-pay and deductible and is opting for DOAC over coumadin as she does not want INR monitoring and dietary limitations.   saturating well on RA - TTE with RHS  leukocytosis reactive to PE and lung infract, now resolving off abx. UA neg

## 2019-07-31 NOTE — PHYSICAL THERAPY INITIAL EVALUATION ADULT - DISCHARGE DISPOSITION, PT EVAL
discharge home; no physical therapy needs required. Patient will benefit from physical therapy intervention while inpatient at [facility] to prevent deconditioning. Patient in agreement with plan.

## 2019-08-04 LAB — BACTERIA BLD CULT: SIGNIFICANT CHANGE UP

## 2019-08-13 ENCOUNTER — RESULT CHARGE (OUTPATIENT)
Age: 49
End: 2019-08-13

## 2019-08-14 ENCOUNTER — APPOINTMENT (OUTPATIENT)
Dept: PULMONOLOGY | Facility: CLINIC | Age: 49
End: 2019-08-14
Payer: COMMERCIAL

## 2019-08-14 VITALS
RESPIRATION RATE: 15 BRPM | OXYGEN SATURATION: 96 % | BODY MASS INDEX: 27.48 KG/M2 | TEMPERATURE: 98.3 F | WEIGHT: 171 LBS | HEART RATE: 111 BPM | DIASTOLIC BLOOD PRESSURE: 89 MMHG | HEIGHT: 66 IN | SYSTOLIC BLOOD PRESSURE: 127 MMHG

## 2019-08-14 LAB — POCT - HEMOGLOBIN (HGB), QUANTITATIVE, TRANSCUTANEOUS: 13.7

## 2019-08-14 PROCEDURE — 88738 HGB QUANT TRANSCUTANEOUS: CPT

## 2019-08-14 PROCEDURE — 94060 EVALUATION OF WHEEZING: CPT

## 2019-08-14 PROCEDURE — 94727 GAS DIL/WSHOT DETER LNG VOL: CPT

## 2019-08-14 PROCEDURE — 94729 DIFFUSING CAPACITY: CPT

## 2019-08-14 PROCEDURE — 99244 OFF/OP CNSLTJ NEW/EST MOD 40: CPT | Mod: 25

## 2019-08-14 RX ORDER — EFINACONAZOLE 100 MG/ML
10 SOLUTION TOPICAL
Qty: 4 | Refills: 0 | Status: DISCONTINUED | COMMUNITY
Start: 2018-02-13 | End: 2019-08-14

## 2019-08-14 RX ORDER — METRONIDAZOLE 500 MG/1
500 TABLET ORAL
Qty: 10 | Refills: 0 | Status: DISCONTINUED | COMMUNITY
Start: 2019-04-03

## 2019-08-14 RX ORDER — FLUCONAZOLE 150 MG/1
150 TABLET ORAL
Qty: 1 | Refills: 0 | Status: DISCONTINUED | COMMUNITY
Start: 2019-04-03

## 2019-08-14 NOTE — CONSULT LETTER
[FreeTextEntry1] : Dear Dr.Frankel,\par \par I had the pleasure of evaluating your patient, FAVIOLA QUIROZ today in pulmonary consultation.  Please refer to my attached note for my findings and recommendations.\par \par \par Thank you for allowing me to participate in the care of your patient, please feel free to call with any questions or concerns.\par \par \par Sincerely,\par \par Soniya Arenas MD\par [facility] \par [facility]\par \par

## 2019-08-14 NOTE — HISTORY OF PRESENT ILLNESS
[FreeTextEntry1] : 49F pmhx htn \par \par 7/19/19: hysterectomy/umbilical hernia repair, post op complicated by bilateral pulmonary emboli\par \par currently denies sob/cp.  Reports having an elevated heart rate at baseline.  Has had episodes of nonspecific dyspnea in the past, no environmental triggers reported.  \par \par no prior hx of blood clots, no fam hx of clotting disorder.\par \par fam hx: reviewed, noncontributory\par never smoker\par worked as a nurse

## 2019-08-14 NOTE — ASSESSMENT
[FreeTextEntry1] : Patient instructed to cont eliquis for minimum 3 months given this was a provoked clot.  [facility] (should be a program where they can get this at an affordable cost).  Counseled about importance of not missing doses of anticoagulation given extent of pulmonary emboli.\par \par would suggest repeat chest ct in three months to re-evaluate lower lobe densities seen on initial CT, would also assess for resolution of clot at that time given extent of disease.

## 2019-08-14 NOTE — PROCEDURE
[FreeTextEntry1] : 7/29/19 CTA:\par IMPRESSION: \par \par Acute bilateral pulmonary emboli in bilateral upper lobes, right middle lobe \par and bilateral lower lobes. Nonenhancing right lower lobe airspace \par consolidation concerning for pulmonary infarct. Partial bilateral lower lobe \par atelectasis. No CT evidence of right heart strain, however correlate with \par echocardiography. \par \par TTE 7/30/19: normal MV, minimal MR, grosslyl normal LV systolic function, normal RV size and function, inadequate TR to estimate RVSP\par \par PFT: Normal spirometry without a significant bronchodilator response.  Lung volumes normal with evidence of air trapping. DLCO normal.\par

## 2019-08-14 NOTE — PHYSICAL EXAM
[General Appearance - In No Acute Distress] : no acute distress [Well Groomed] : well groomed [Neck Appearance] : the appearance of the neck was normal [] : no respiratory distress [Heart Sounds] : normal S1 and S2 [Respiration, Rhythm And Depth] : normal respiratory rhythm and effort [Auscultation Breath Sounds / Voice Sounds] : lungs were clear to auscultation bilaterally [Exaggerated Use Of Accessory Muscles For Inspiration] : no accessory muscle use [Nail Clubbing] : no clubbing of the fingernails [Cyanosis, Localized] : no localized cyanosis

## 2019-09-03 ENCOUNTER — APPOINTMENT (OUTPATIENT)
Dept: PULMONOLOGY | Facility: CLINIC | Age: 49
End: 2019-09-03

## 2019-10-16 ENCOUNTER — APPOINTMENT (OUTPATIENT)
Dept: PULMONOLOGY | Facility: CLINIC | Age: 49
End: 2019-10-16

## 2020-01-23 NOTE — ED ADULT NURSE NOTE - NS ED NURSE LEVEL OF CONSCIOUSNESS ORIENTATION
[facility]  Pediatric Critical Care  Progress Note    Patient Name: Adam Barba  MRN: [id number]  Admission Date: 1/16/2020  Hospital Length of Stay: 7 days  Code Status: Full Code   Attending Provider: Colten Salazar MD   Primary Care Physician: Garrick Szymanski MD    Subjective:     HPI: Adam Barba is a former 32wga (delivered for IUGR and maternal pre-eclampsia) infant male with Trisomy 21 and a history of a VSD and ASD. He also has a history to FTT 2/2 T21 and heart failure, curently managed with furosemide 1mg/kg BID. He was never able to start enalapril due to insurance issues.       Operative Events: A pre-operative HOENY showed a large ventricular septal defect, a predominantly left to right ventricular shunt, a moderate atrial septal defect, a moderate left to right atrial shunt, and mild left atrial enlargement. On 1/16/2020, Adam underwent patch closure of ASD, VSD, and clipped PDA. Pt initially developed heart block coming off bypass and temporary wires were placed and pacing required. The patient was able to be reversed and de-cannulated from bypass without any issues.The original post-operative HONEY was reported as showing moderate plus decreased LV function. Hemodynamic compromise was noted with a worsening lung compliance and decreased oxygen saturations which required approximately 5 minutes of CPR. At this time, blood was also noted in the ETT and it was decided to give heparin with plans to re-cannulate. It was noted that Adam had developed pulmonary hemorrhage. He was unsuccessful in coming off of bypass for the second time and the ECMO team was notified. Total CPB time was 153 minutes, X-clamp time 52 minutes, and MUF 700mL. Another post-operative HONEY showed mild to moderately decreased left ventricular systolic function and moderate right pulmonary artery branch stenosis. Chest tubes x 2 inserted and pt was placed on ECMO. Wound vac in  place. Pt returned to the pediatric CVICU on ECMO, sedated, paralyzed, and on Epinephrine, Milrinone, and Calcium infusions.    Interval History: Continued to have significant bleeding overnight requiring frequent replacements of PRBCs, FFP, and platelets in addition to some albumin to try and maintain MAPs within goal.  Trialed increased RPM with no significant change in MAPs. Milrinone decreased to 0.25 and ultimately discontinued this AM for hypotension.  Lasix infusion held.  Planning for surgical exploration in AM with evaluation for possible decannulation following a complete 48 hours of ventricular rest with LV vent.     Objective:     Vital Signs Range (Last 24H):  Temp:  [96.8 °F (36 °C)-97.2 °F (36.2 °C)]   Pulse:  []   Resp:  [10-39]   SpO2:  [62 %-100 %]   Arterial Line BP: (36-55)/(33-49)     I & O (Last 24H):    Intake/Output Summary (Last 24 hours) at 1/23/2020 1115  Last data filed at 1/23/2020 1032  Gross per 24 hour   Intake 1323.4 ml   Output 1561.6 ml   Net -238.2 ml   Urine Output: 5.3 cc/kg/hr  Chest tube output: R-10 cc total, L-60 cc total  Wound Vac: 957 cc    Ventilator Data (Last 24H):     Vent Mode: PC  Oxygen Concentration (%):  [30] 30  Resp Rate Total:  [10 br/min-19.1 br/min] 10 br/min  PEEP/CPAP:  [10 cmH20] 10 cmH20  Mean Airway Pressure:  [11 cmH20] 11 cmH20    Hemodynamic Parameters (Last 24H):        Physical Exam:  Constitutional: Small for age. He is sedated and intubated. Twitches to minimal stimulation.    HENT: Trisomy 21 facies, periorbital edema and body edema slightly increased from prior, stable from overnight, + scalp edema  Head: Anterior fontanelle is full.  No bulging or pulsatility noted.   Eyes: Pupils are equal, round, and reactive to light. 2mm and brisk bilaterally. Mild periorbital edema today, stable from yesterday. Occipital fullness and edema noted.  Nose: No nasal discharge.   Mouth/Throat: Mucous membranes are moist. ETT in place. OG in place.    Cardiovascular: VA ECMO cannulas in place through central open chest.  Normal S1, S2 with faint muffled heart sounds. No murmur appreciated.   Pulmonary/Chest: He is intubated. Open chest with wound vac-good suction noted, ventilator in place.   Minimal chest rise, course breath sounds audible with ventilator breaths.   Abdominal: Soft, non-distended. Bowel sounds are absent. Hepatosplenomegaly: Liver edge palpated in pelvis, about 3cm below costal margin.  Musculoskeletal: Moving all four extremities spontaneously.   Neurological: Sedated, but awakes to minimal stimulation. Symmetric without focal deficits.   Skin: Skin is warm and dry. Capillary refill takes 2-3 seconds. No rash noted. No cyanosis. No pallor. Some darkening on the occiput    Lines/Drains/Airways     Central Venous Catheter Line                 Percutaneous Central Line Insertion/Assessment - double lumen  01/16/20 0915 7 days         ECMO Cannula 01/16/20 1520  6 days         ECMO Cannula 01/16/20 1520 right atrial 6 days         ECMO Cannula 01/21/20 1800  1 day          Drain                 NG/OG Tube Cortrak 6 Fr. Right nostril -- days         Urethral Catheter 01/16/20 0928 Temperature probe;Straight-tip 8 Fr. 7 days         Chest Tube 01/16/20 1833 1 Right Pleural 6 days         Chest Tube 01/16/20 1834 2 Left Pleural 6 days          Airway                 Airway - Non-Surgical Endotracheal Tube -- days          Arterial Line                 Arterial Line 01/16/20 0751 Right Radial 7 days          Line                 Pacer Wires 01/16/20 1329 6 days          Peripheral Intravenous Line                 Peripheral IV - Single Lumen 01/16/20 0842 22 G Left Saphenous 7 days         Peripheral IV - Single Lumen 01/16/20 0900 22 G Left Forearm 7 days                Laboratory (Last 24H):   ABG:   Recent Labs   Lab 01/23/20  0406 01/23/20  0758 01/23/20  0800 01/23/20  0816 01/23/20  0816   PH 7.432 7.440 7.469* 7.430 7.430   PCO2 47.6* 40.3  44.9 49.4* 48.8*   HCO3 31.8* 27.3 32.6* 32.8* 32.3*   POCSATURATED 100 82* 100 100 100   BE 8 3 9 8 8     CMP:   Recent Labs   Lab 01/22/20 2207 01/23/20  0406 01/23/20  0932    145 141   K 3.9 4.0 4.1    107 107   CO2 26 25 25   * 110 120*   BUN 39* 39* 40*   CREATININE 0.6 0.6 0.6   CALCIUM 9.5 9.5 9.0   PROT 5.3* 5.2* 4.9*   ALBUMIN 3.0 3.1 2.9   BILITOT 1.0 1.3* 1.1*   ALKPHOS 54* 52* 51*   AST 35 34 33   ALT 18 16 14   ANIONGAP 11 13 9   EGFRNONAA SEE COMMENT SEE COMMENT SEE COMMENT     CBC:   Recent Labs   Lab 01/22/20 2207 01/23/20 0406 01/23/20  0406 01/23/20  0816 01/23/20  0932   WBC 5.52*  --  4.49*  --   --  5.52*   HGB 12.2  --  11.0  --   --  10.8   HCT 36.4   < > 33.0 32* 31* 33.1   *  --  100*  --   --  139*    < > = values in this interval not displayed.     Chest X-Ray: reviewed, ECMO cannulas, ETT and lines in good position    Pertinent Diagnostic Results:  Post-Op HONEY 1/16:  Post-op study reviewed with surgery team after patient developed pulmonary hemorrhage and hemodynamic compromise  post-operatively requiring ECMO.  Mild left atrial enlargement.  Normal left ventricle structure and size.  Dilated right ventricle, mild.  Mild to moderately decreased left ventricular systolic function.   Normal right ventricular systolic function.  Trivial left to right ventrcular shunt at superior margin of the VSD patch..  No tricuspid valve insufficiency.  Normal pulmonic valve velocity.  Right pulmonary artery branch stenosis, moderate.  No mitral valve insufficiency.  Normal aortic valve velocity.  No aortic valve insufficiency.    Echo 1/21: on inotropic support  Atrial septal defect, ventricular septal defect and patent ductus arteriosus  - s/p patch closure of atrial and ventricular septal defect and ligation of PDA (1/16/20)  - on VA ECMO.  Limited study to evaluate ventricular function on atrial pacing and increased inotropic support:  1. Minimal left ventricular free  wall contractility that is unchanged from the previous study. No change with clamping of left atrial vent.  2. Moderately diminished right ventricular systolic function, on full ECMO flow, that is improved from the previous study.    Head ultrasound 1/21 (after cath and OR)  There is no subependymal, intraventricular, or parenchymal hemorrhage.  Brain parenchyma has normal contour for age.  Ventricles are normal in size. Cavum septum pellucidum is present.  No extra-axial fluid collections.  Two small left choroid plexus cysts again identified, unchanged.    Cardiac Catheterization 1/21:  1.  Trisomy 21.  2.  Surgically repaired VSD/ASD/PDA, failed ECMO wean.  3.  No ascending aorta or arch stenosis or dissection detected.  4.  No coronary stenoses or clot identified.  The presence of LAD to RCA collateralization suggests possible prior LAD occlusion/recanalization but is not diagnostic.  5.  Moderate+ bilateral proximal PA branch stenoses.    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  Post operative course was complicated by decreased LV function and inability to wean off of cardiopulmonary bypass after a cardiac arrest and severe pulmonary hemorrhage, likely secondary to LA hypertension. He has severe heart failure requiring ECMO support to maintain cardiac output. He also has acute mixed  hypercarbic and hypoxic respiratory failure secondary to pulmonary hemorrhage and cardiac failure.     Currently ECMO day 7, day 2 with LV vent for complete decompression. Awaiting recovery of adequate cardiac function. Currently with preservation of other end-organ function (neurologic, renal, hepatic), but certainly at risk for multisystem dysfunction in the setting of extracorporeal support.     CNS:  Post operative pain and sedation  - continue dexmedetomidine gtt 1, hydromorphone gtt 0.025 (increased slightly 1/23)  - PRNs available: hydromorphone, vecuronium, lorazepam, acetaminophen    Neuroprotection post cardiac arrest  - Close monitoring of cerebral NIRS  - Neuro-protective strategies post arrest and OR: Temp 35, Na levels > 140, monitor for seizure activity    Screening:  - Head US 1/21 PM without bleed, repeat Friday or sooner with clinical concern  - screening video EEG done- spot assessment given cardiac arrest in OR, would follow up if concerns for clinical seizures     PULM:  Acute mixed hypercarbic and hypoxic respiratory failure  - Monitor patient ABGs every 4 hours  - Ventilator on rest settings: RR 10, PC 10, PEEP 10, PS 10  - Titrate sweep on ECMO circuit to maintain normal pH and CO2 ranges    Pulmonary hemorrhage secondary to left atrial hypertension  - Continue pulmonary toilet today with xopenex and suctioning Q4  - Re-evaluate need for bronch if patient starts to wean from cardiac support    CV:  Severe heart failure requiring VA-ECMO support via central cannulation (14Fr RA, 12Fr LA, 8Fr Ao)  - Full cardiac output support with goal flows 100-120 ml/kg/min  - Will continue full flows and afterload reduction to maintain decompressed LV and avoid ejection as much as possible  - Maintain MAP 40-50, with otherwise good markers of perfusion and good flows   - Continue holding milrinone at this time remain persistently low until we are ready to wean off ECMO  - Will consider starting vasopressin  today to augment MAP today if needed  - Follow daily ECHOs to check function recovery - check with pacing to ensure sinus   - Follow lactates closely, treat acidosis    ASD, VSD, and PDA s/p ASD, VSD repair and PDA closure, currently with open chest  - Lasix gtt on hold - will resume today as able to maintain goal fluid balance.   - Goal fluid balance of -50 to -150   - Continue to monitor UOP as marker of end-organ perfusion    Dysrrhythmia: CHB in OR, now between junctional and sinus rhythms  - A and V wires in place with pacer set to DDD for emergency support if ECMO fails  - will pace during trials, AAI    FEN/GI:  Nutrition  - TP feeds: increase trophic feeds today at 4ml/hr of Neocate 20 kcal/oz.  - Continue half volume TPN with gentle electrolytes for nutrition, optimize nutrition  - Monitor electrolytes, correct/normalize   - Protonix for GI ppx    RENAL:  - Goal fluid balance -50 to -150  - Strict I/Os  - Maintain liu catheter in place, monitor bleeding    HEME:  Anticoagulation for ECMO circuit, at risk for significant bleeding, thrombosis, hemolysis  - Will maintain therapeutic anticoagulation to avoid LV thrombus formation  - Goal -180, can consider decreasing to 140-160 this afternoon if ongoing bleeding concerns   - Goal fibrinogen level >150, Goal platelets > 100, Goal hematocrit > 30  - Heparin infusion per protocol - titrate for goals  - Monitor for bleeding/chest tube output/wound vac  - Monitor CBC and coags every 12 hours, space when able.  - Plasma free hemoglobin daily (send out)-monitor for clinical signs of hemolysis   - All blood products should be leukocyte reduced due to being on ECMO and risk of needing transplant should function not recover.     ID:  - Continue Vanc and Cefepime per open chest antibiotic protocol   - Continue fluconazole ppx (open chested and/or lymphopenic)  - Will monitor vanc trough prior to each dose to preserve kidney function  - Blood cultures q48 hours  for surveillance    PLASTICS:  -Arterial line, CVL, CT x 3, Wound Vac, Walker, PIVx2, ECMO cannulas and LA vent    SOCIAL/DISPO:  - Spoke to parents at bedside today. Discussed the plan for the operating room including the removal of the LV vent and possible ECMO decannulation. We discussed that ECMO is getting more difficult to manage (bleeding, hypotension, renal insufficiency with lower UOP), and that we always weigh the pros and cons of ECMO each day. We again discussed the need for his heart muscle to have better contractility with assistance of some medications (epi, milrinone, but not extraordinary doses). We discussed that there are likely no other methods of support for his poor heart function. Parents remain hopeful for recovery.      Tiffani Augustine M.D.  Pediatric Cardiac Critical Care Medicine  [facility]   Oriented - self; Oriented - place; Oriented - time

## 2021-04-19 NOTE — H&P PST ADULT - VISION (WITH CORRECTIVE LENSES IF THE PATIENT USUALLY WEARS THEM):
hair removal not indicated Normal vision: sees adequately in most situations; can see medication labels, newsprint

## 2021-08-07 NOTE — PATIENT PROFILE ADULT - SURGICAL SITE DRAIN
Patient patient found down at home lives with his wife.  Per EMS hypotensive and clammy and unresponsive  at presentation.  In the ED bedside echo showed large pericardial effusion as well as CT chest .    Only does no want any aggressive intervention.  Cardiology is aware and are not planning to do at urgent pericardiocentesis.  Likely to be transition to hospice/comfort care.   no

## 2021-11-05 NOTE — PATIENT PROFILE ADULT - HOME ACCESSIBILITY CONCERNS
- Continue all regular medications  - For pain, take tylenol as directed on the packaging  - Follow up with your primary doctor within 3 days  - You were given copies of labs and/or imaging results if applicable, please take them to your follow up appointments  - Return to the ER for any worsening symptoms or concerns
none

## 2022-02-02 NOTE — H&P ADULT - NSICDXNOFAMILYHX_GEN_ALL_CORE
Quality 226: Preventive Care And Screening: Tobacco Use: Screening And Cessation Intervention: Patient screened for tobacco use and is an ex/non-smoker
Quality 431: Preventive Care And Screening: Unhealthy Alcohol Use - Screening: Patient not identified as an unhealthy alcohol user when screened for unhealthy alcohol use using a systematic screening method
Detail Level: Detailed
Quality 110: Preventive Care And Screening: Influenza Immunization: Influenza Immunization previously received during influenza season
Quality 130: Documentation Of Current Medications In The Medical Record: Current Medications Documented
<-- Click to add NO pertinent Family History

## 2022-05-04 ENCOUNTER — APPOINTMENT (OUTPATIENT)
Dept: PULMONOLOGY | Facility: CLINIC | Age: 52
End: 2022-05-04
Payer: COMMERCIAL

## 2022-05-04 VITALS
TEMPERATURE: 97.5 F | HEIGHT: 66 IN | HEART RATE: 82 BPM | WEIGHT: 174 LBS | DIASTOLIC BLOOD PRESSURE: 91 MMHG | SYSTOLIC BLOOD PRESSURE: 154 MMHG | RESPIRATION RATE: 16 BRPM | BODY MASS INDEX: 27.97 KG/M2 | OXYGEN SATURATION: 96 %

## 2022-05-04 DIAGNOSIS — R93.89 ABNORMAL FINDINGS ON DIAGNOSTIC IMAGING OF OTHER SPECIFIED BODY STRUCTURES: ICD-10-CM

## 2022-05-04 DIAGNOSIS — R19.00 INTRA-ABDOMINAL AND PELVIC SWELLING, MASS AND LUMP, UNSPECIFIED SITE: ICD-10-CM

## 2022-05-04 PROCEDURE — 99215 OFFICE O/P EST HI 40 MIN: CPT

## 2022-05-04 NOTE — PROCEDURE
[FreeTextEntry1] : patient declined cxr and breathing tests today \par wants to do repeat cta as we had planned in 2019 but then pandemic happened.\par \par Prior exams reviewed:\par \par 7/29/19 CTA:\par IMPRESSION: \par \par Acute bilateral pulmonary emboli in bilateral upper lobes, right middle lobe \par and bilateral lower lobes. Nonenhancing right lower lobe airspace \par consolidation concerning for pulmonary infarct. Partial bilateral lower lobe \par atelectasis. No CT evidence of right heart strain, however correlate with \par echocardiography. \par \par TTE 7/30/19: normal MV, minimal MR, grosslyl normal LV systolic function, normal RV size and function, inadequate TR to estimate RVSP\par \par PFT: Normal spirometry without a significant bronchodilator response.  Lung volumes normal with evidence of air trapping. DLCO normal.\par

## 2022-05-04 NOTE — PHYSICAL EXAM
[No Acute Distress] : no acute distress [Normal S1, S2] : normal s1, s2 [No Resp Distress] : no resp distress [Clear to Auscultation Bilaterally] : clear to auscultation bilaterally [Benign] : benign [TextBox_89] : soft/moveable/nontender lump right upper abdomen

## 2022-05-04 NOTE — HISTORY OF PRESENT ILLNESS
[Never] : never [TextBox_4] : Patient last seen in 2019\par at that time had just had PE after hysterectomy\par has since been taken off anticoagulation, states she didn't have any follow up scans done \par feeling ok\par has a chronic lump under her skin in upper abdomen she is worried about--has been present for many years, hasn't changed, nontender and soft/moveable.\par \par rare cough\par no dyspnea, cp

## 2022-05-04 NOTE — ASSESSMENT
[FreeTextEntry1] : given extent of PE in 2019 and pulmonary consolidations at the time will repeat cta now to further evaluate.\par \par lump on abdomen likely lipoma? suggest she follow up with general surgeon for this.\par \par Total encounter time 30 minutes.\par

## 2022-05-06 ENCOUNTER — APPOINTMENT (OUTPATIENT)
Dept: CT IMAGING | Facility: CLINIC | Age: 52
End: 2022-05-06

## 2022-06-17 NOTE — PATIENT PROFILE ADULT - NSPROGENOTHERPROVIDER_GEN_A_NUR
I was able to reach patient and discuss normal labs and plan of care per provider note. Patient verbalized understanding.   none

## 2022-08-29 ENCOUNTER — NON-APPOINTMENT (OUTPATIENT)
Age: 52
End: 2022-08-29

## 2022-08-30 ENCOUNTER — EMERGENCY (EMERGENCY)
Facility: HOSPITAL | Age: 52
LOS: 1 days | Discharge: HOME | End: 2022-08-30
Attending: EMERGENCY MEDICINE
Payer: COMMERCIAL

## 2022-08-30 VITALS
HEART RATE: 74 BPM | OXYGEN SATURATION: 100 % | SYSTOLIC BLOOD PRESSURE: 161 MMHG | TEMPERATURE: 98 F | DIASTOLIC BLOOD PRESSURE: 111 MMHG | RESPIRATION RATE: 16 BRPM

## 2022-08-30 VITALS
SYSTOLIC BLOOD PRESSURE: 164 MMHG | OXYGEN SATURATION: 99 % | HEIGHT: 66 IN | RESPIRATION RATE: 16 BRPM | HEART RATE: 94 BPM | WEIGHT: 181 LBS | DIASTOLIC BLOOD PRESSURE: 118 MMHG

## 2022-08-30 DIAGNOSIS — Z98.890 OTHER SPECIFIED POSTPROCEDURAL STATES: Chronic | ICD-10-CM

## 2022-08-30 LAB
ALBUMIN SERPL ELPH-MCNC: 4.9 G/DL — SIGNIFICANT CHANGE UP (ref 3.3–5)
ALP SERPL-CCNC: 138 U/L — HIGH (ref 40–120)
ALT FLD-CCNC: 16 U/L — SIGNIFICANT CHANGE UP (ref 10–45)
ANION GAP SERPL CALC-SCNC: 10 MMOL/L — SIGNIFICANT CHANGE UP (ref 5–17)
AST SERPL-CCNC: 22 U/L — SIGNIFICANT CHANGE UP (ref 10–40)
BASOPHILS # BLD AUTO: 0.03 K/UL — SIGNIFICANT CHANGE UP (ref 0–0.2)
BASOPHILS NFR BLD AUTO: 0.3 % — SIGNIFICANT CHANGE UP (ref 0–2)
BILIRUB SERPL-MCNC: 0.6 MG/DL — SIGNIFICANT CHANGE UP (ref 0.2–1.2)
BUN SERPL-MCNC: 8 MG/DL — SIGNIFICANT CHANGE UP (ref 7–23)
CALCIUM SERPL-MCNC: 9.7 MG/DL — SIGNIFICANT CHANGE UP (ref 8.4–10.5)
CHLORIDE SERPL-SCNC: 97 MMOL/L — SIGNIFICANT CHANGE UP (ref 96–108)
CO2 SERPL-SCNC: 32 MMOL/L — HIGH (ref 22–31)
CREAT SERPL-MCNC: 0.71 MG/DL — SIGNIFICANT CHANGE UP (ref 0.5–1.3)
EGFR: 102 ML/MIN/1.73M2 — SIGNIFICANT CHANGE UP
EOSINOPHIL # BLD AUTO: 0.08 K/UL — SIGNIFICANT CHANGE UP (ref 0–0.5)
EOSINOPHIL NFR BLD AUTO: 0.8 % — SIGNIFICANT CHANGE UP (ref 0–6)
GLUCOSE SERPL-MCNC: 97 MG/DL — SIGNIFICANT CHANGE UP (ref 70–99)
HCT VFR BLD CALC: 47 % — HIGH (ref 34.5–45)
HGB BLD-MCNC: 16.7 G/DL — HIGH (ref 11.5–15.5)
IMM GRANULOCYTES NFR BLD AUTO: 0.3 % — SIGNIFICANT CHANGE UP (ref 0–1.5)
LYMPHOCYTES # BLD AUTO: 2.36 K/UL — SIGNIFICANT CHANGE UP (ref 1–3.3)
LYMPHOCYTES # BLD AUTO: 22.9 % — SIGNIFICANT CHANGE UP (ref 13–44)
MCHC RBC-ENTMCNC: 28 PG — SIGNIFICANT CHANGE UP (ref 27–34)
MCHC RBC-ENTMCNC: 35.5 GM/DL — SIGNIFICANT CHANGE UP (ref 32–36)
MCV RBC AUTO: 78.9 FL — LOW (ref 80–100)
MONOCYTES # BLD AUTO: 0.54 K/UL — SIGNIFICANT CHANGE UP (ref 0–0.9)
MONOCYTES NFR BLD AUTO: 5.2 % — SIGNIFICANT CHANGE UP (ref 2–14)
NEUTROPHILS # BLD AUTO: 7.26 K/UL — SIGNIFICANT CHANGE UP (ref 1.8–7.4)
NEUTROPHILS NFR BLD AUTO: 70.5 % — SIGNIFICANT CHANGE UP (ref 43–77)
NRBC # BLD: 0 /100 WBCS — SIGNIFICANT CHANGE UP (ref 0–0)
PLATELET # BLD AUTO: 243 K/UL — SIGNIFICANT CHANGE UP (ref 150–400)
POTASSIUM SERPL-MCNC: 3.6 MMOL/L — SIGNIFICANT CHANGE UP (ref 3.5–5.3)
POTASSIUM SERPL-SCNC: 3.6 MMOL/L — SIGNIFICANT CHANGE UP (ref 3.5–5.3)
PROT SERPL-MCNC: 8.6 G/DL — HIGH (ref 6–8.3)
RBC # BLD: 5.96 M/UL — HIGH (ref 3.8–5.2)
RBC # FLD: 14.5 % — SIGNIFICANT CHANGE UP (ref 10.3–14.5)
SODIUM SERPL-SCNC: 139 MMOL/L — SIGNIFICANT CHANGE UP (ref 135–145)
WBC # BLD: 10.3 K/UL — SIGNIFICANT CHANGE UP (ref 3.8–10.5)
WBC # FLD AUTO: 10.3 K/UL — SIGNIFICANT CHANGE UP (ref 3.8–10.5)

## 2022-08-30 PROCEDURE — 85025 COMPLETE CBC W/AUTO DIFF WBC: CPT

## 2022-08-30 PROCEDURE — 71046 X-RAY EXAM CHEST 2 VIEWS: CPT

## 2022-08-30 PROCEDURE — 99284 EMERGENCY DEPT VISIT MOD MDM: CPT | Mod: 25

## 2022-08-30 PROCEDURE — 93010 ELECTROCARDIOGRAM REPORT: CPT

## 2022-08-30 PROCEDURE — 71046 X-RAY EXAM CHEST 2 VIEWS: CPT | Mod: 26

## 2022-08-30 PROCEDURE — 80053 COMPREHEN METABOLIC PANEL: CPT

## 2022-08-30 PROCEDURE — 99285 EMERGENCY DEPT VISIT HI MDM: CPT | Mod: 25

## 2022-08-30 PROCEDURE — 93005 ELECTROCARDIOGRAM TRACING: CPT

## 2022-08-30 PROCEDURE — 36415 COLL VENOUS BLD VENIPUNCTURE: CPT

## 2022-08-30 RX ORDER — AMLODIPINE BESYLATE 2.5 MG/1
10 TABLET ORAL ONCE
Refills: 0 | Status: COMPLETED | OUTPATIENT
Start: 2022-08-30 | End: 2022-08-30

## 2022-08-30 RX ORDER — METOPROLOL TARTRATE 50 MG
25 TABLET ORAL ONCE
Refills: 0 | Status: COMPLETED | OUTPATIENT
Start: 2022-08-30 | End: 2022-08-30

## 2022-08-30 RX ADMIN — AMLODIPINE BESYLATE 10 MILLIGRAM(S): 2.5 TABLET ORAL at 14:29

## 2022-08-30 RX ADMIN — Medication 25 MILLIGRAM(S): at 16:49

## 2022-08-30 RX ADMIN — AMLODIPINE BESYLATE 10 MILLIGRAM(S): 2.5 TABLET ORAL at 18:54

## 2022-08-30 NOTE — ED PROVIDER NOTE - PHYSICAL EXAMINATION
General: Alert and Orientated x 3. No apparent distress.  Head: Normocephalic and atraumatic.  Eyes: PERRLA with EOMI.  Neck: Supple. Trachea midline.   Cardiac: Normal S1 and S2 w/ RRR. No murmurs appreciated. No JVD appreciated.  Pulmonary: CTA bilaterally. No increased WOB. No wheezes or crackles.  Abdominal: Soft, non-tender. (+) bowel sounds appreciated in all 4 quadrants. No hepatosplenomegaly.   Neurologic: No focal sensory or motor deficits. cn2-12 grossly intact.   Musculoskeletal: Strength appropriate in all 4 extremities for age with no limited ROM.  Skin: Color appropriate for race. Intact, warm, and well-perfused.  Psychiatric: Appropriate mood and affect. No apparent risk to self or others.

## 2022-08-30 NOTE — ED ADULT NURSE NOTE - NEURO MENTATION
[facility] Consult Note       Primary GI Physician: Dr. Tiny Chandler    Referring Provider:  Caren Arshad NP    Consult Date:  5/14/2019    Admit Date:  5/7/2019    Chief Complaint:  Cirrhosis with new ascites    Subjective:     History of Present Illness:  Patient is a 57 y.o. female with PMH of a 12+ year history of diabetes, HTN, and recent diagnosis of Cirrhosis in Feb 2019, who was admitted for severe cellulitis of the right foot requiring surgical debridement 5/13/19 and who is seen in consultation at the request of Caren Arshad NP for cirrhosis with new ascites. Patient was seen in consultation in Feb for new Cirrhosis with JENNY and thrombocytopenia. Liver serology to include ceruloplasmin, KRISTEN, AMA, ASMA, and hepatitis screen were negative. 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 She was found to have JENNY. Patient reports having a long prior history of JENNY related to menorrhagia. She required transfusion in February and underwent EGD and colonoscopy without significant findings. CT at that time showed cirrhotic changes of the liver and splenomegaly. Since this admission, pt reports a nearly 20lb weight gain with lower extremity edema and increasing abdominal girth. Ultrasound of the abdomen 5/13 showed cirrhotic liver and changes and upper abdominal ascites. She states that she has been getting IV fluids at 100 cc/hour in addition to the fluids she gets with Antibiotic infusions. She believes that she has been fluid overloaded. Her hgb trended down and she received one unit of PRBC 5/13. She denies any overt bleeding. She has had intermittent nausea and diarrhea. She states that her stools have been loose all her life but she had significant watery diarrhea last evening but Cdiff was negative.  She denies abdominal pain and feels that her abdominal distention is much improved since she started fluid restriction and diuretics (lasix 40 IV q12, aldactone 25mg daily). She has requested daily weights. INR this admission was 1.3 but she has significant pancytopenia (WBC 3.1, hgb 7.9 post transfusion, MCV 82, Plt 61k). Mild hypokalemia is noted. LFTs appear to be stable but albumin is 1.8. She mentions that she is  and lives alone and is having financial difficulty. She is trying to apply for short term disability but has not discussed long term solutions. She has been unable to work related to multiple hospitalizations and cellulitis of her right lower extremity this year. PMH:  Past Medical History:   Diagnosis Date    Diabetes ([address]     Hypertension        PSH:  Past Surgical History:   Procedure Laterality Date    COLONOSCOPY N/A 3/2/2019    COLONOSCOPY ROOM 615 performed by Dodie Crigler, MD at [facility] ENDOSCOPY       Allergies: Allergies   Allergen Reactions    Pcn [Penicillins] Hives and Rash     Tolerates cephalosporins    Phenergan [Promethazine] Other (comments)     \"climbing out of my skin\"        Home Medications:  Prior to Admission medications    Medication Sig Start Date End Date Taking? Authorizing Provider   pantoprazole (PROTONIX) 40 mg tablet Take 1 Tab by mouth daily. 5/7/19  Yes Anish Johnson MD   hydroCHLOROthiazide (HYDRODIURIL) 25 mg tablet as needed. 1/23/19  Yes Provider, Historical   Ferrous Fumarate 325 mg (106 mg iron) tab Take 325 mg by mouth daily. Yes Provider, Historical   insulin glargine (LANTUS) 100 unit/mL injection 40 Units by SubCUTAneous route every morning. Patient taking differently: 50 Units by SubCUTAneous route every morning. 3/4/19  Yes Christel Quiroga, DO   insulin aspart U-100 (NOVOLOG) 100 unit/mL inpn Less than 150 =   0 units           150 -199 =   2 units  200 -249 =   4 units  250 -299 =   6 units  300 -349 =   8 units  Before meals and at night 3/4/19  Yes Christel Quiroga, DO   gabapentin (NEURONTIN) 300 mg capsule Take 1 Cap by mouth nightly.  2/27/19  Yes Damion Baylee Espinosa MD   sertraline (ZOLOFT) 50 mg tablet Take 1 Tab by mouth daily. 2/27/19  Yes Katherine Almeida MD   atorvastatin (LIPITOR) 10 mg tablet Take 1 Tab by mouth daily. Patient taking differently: Take 20 mg by mouth daily. 1/23/19  Yes Katherine Almeida MD   Insulin Needles, Disposable, (ARIE PEN NEEDLE) 32 gauge x 5/32\" ndle For use with insulin pens   E11.65 Uncontrolled type 2 DM 1/23/19  Yes Katherine Almeida MD   levothyroxine (SYNTHROID) 125 mcg tablet Take 1 Tab by mouth Daily (before breakfast). 1/17/19  Yes Katherine Almeida MD   Blood-Glucose Meter monitoring kit Test FSBS 3x daily before meals. E11.65 1/16/19  Yes Katherine Almeida MD   lancets misc Test FSBS 3x daily before meals. E11.65 1/16/19  Yes Katherine Almeida MD   glucose blood VI test strips (BLOOD GLUCOSE TEST) strip Test FSBS 3x daily before meals. E11.65 1/16/19  Yes Katherine Almeida MD   Insulin Needles, Disposable, 31 gauge x 5/16\" ndle by SubCUTAneous route daily.  E11.65 1/12/16  Yes Katherine Almeida MD       Hospital Medications:  Current Facility-Administered Medications   Medication Dose Route Frequency    0.9% sodium chloride infusion 250 mL  250 mL IntraVENous PRN    0.9% sodium chloride infusion 250 mL  250 mL IntraVENous PRN    simethicone (MYLICON) tablet 80 mg  80 mg Oral QID PRN    promethazine (PHENERGAN) with saline injection 6.25 mg  6.25 mg IntraVENous Q4H PRN    magnesium oxide (MAG-OX) tablet 400 mg  400 mg Oral BID    furosemide (LASIX) injection 40 mg  40 mg IntraVENous Q12H    spironolactone (ALDACTONE) tablet 25 mg  25 mg Oral DAILY    ondansetron (ZOFRAN) injection 4 mg  4 mg IntraVENous Q6H PRN    sertraline (ZOLOFT) tablet 100 mg  100 mg Oral DAILY    vancomycin (VANCOCIN) 1250 mg in  ml infusion  1,250 mg IntraVENous Q12H    loperamide (IMODIUM) capsule 2 mg  2 mg Oral Q8H PRN    0.9% sodium chloride infusion 250 mL  250 mL IntraVENous PRN    sodium chloride (NS) flush 10 mL  10 mL InterCATHeter Q8H    sodium chloride (NS) flush 10 mL  10 mL InterCATHeter PRN    ferrous sulfate tablet 325 mg  325 mg Oral DAILY    gabapentin (NEURONTIN) capsule 300 mg  300 mg Oral QHS    levothyroxine (SYNTHROID) tablet 125 mcg  125 mcg Oral ACB    pantoprazole (PROTONIX) tablet 40 mg  40 mg Oral DAILY    dextrose 40% (GLUTOSE) oral gel 1 Tube  15 g Oral PRN    glucagon (GLUCAGEN) injection 1 mg  1 mg IntraMUSCular PRN    dextrose (D50) infusion 12.5-25 g  25-50 mL IntraVENous PRN    insulin lispro (HUMALOG) injection 0-10 Units  0-10 Units SubCUTAneous AC&HS    sodium chloride (NS) flush 5-40 mL  5-40 mL IntraVENous Q8H    sodium chloride (NS) flush 5-40 mL  5-40 mL IntraVENous PRN    acetaminophen (TYLENOL) tablet 650 mg  650 mg Oral Q4H PRN    oxyCODONE-acetaminophen (PERCOCET 7.5) 7.5-325 mg per tablet 1 Tab  1 Tab Oral Q4H PRN    naloxone (NARCAN) injection 0.4 mg  0.4 mg IntraVENous PRN    cefepime (MAXIPIME) 1 g in 0.9% sodium chloride (MBP/ADV) 50 mL ADV  1 g IntraVENous Q12H       Social History:  Social History     Tobacco Use    Smoking status: Never Smoker    Smokeless tobacco: Never Used   Substance Use Topics    Alcohol use: No       Pt denies any history of drug use, blood transfusions, or tattoos. Family History:  Family History   Problem Relation Age of Onset    Hypertension Mother     Other Mother         PVD    Diabetes Mother     Parkinson's Disease Mother     Diabetes Father        Review of Systems:  A detailed 10 system ROS is obtained, with pertinent positives as listed above. All others are negative. Diet:  diabetic    Objective:     Physical Exam:  Vitals:  Visit Vitals  /70   Pulse 78   Temp 98.2 °F (36.8 °C)   Resp 18   Ht 5' 7\" (1.702 m)   Wt 94.8 kg (209 lb)   SpO2 95%   BMI 32.73 kg/m²     Gen:  Pt is alert, cooperative, no acute distress  Skin:  Extremities and face reveal no rashes. HEENT: Sclerae anicteric.   Extra-occular muscles are intact. No oral ulcers. No abnormal pigmentation of the lips. The neck is supple. Cardiovascular: Regular rate and rhythm. No murmurs, gallops, or rubs. Respiratory:  Comfortable breathing with no accessory muscle use. Clear breath sounds anteriorly with no wheezes, rales, or rhonchi. GI:  Abdomen distended, but soft, and nontender. Normal active bowel sounds. No enlargement of the liver or spleen. No masses palpable. Rectal:  Deferred  Musculoskeletal:  1+ pitting edema of lower extremities and wound vac on right foot. Neurological:  Gross memory appears intact. Patient is alert and oriented. Psychiatric:  Mood appears appropriate with judgement intact. Lymphatic:  No cervical or supraclavicular adenopathy. Laboratory:    Recent Labs     05/14/19  0442 05/13/19 2013 05/13/19  1010 05/13/19  0521 05/13/19  0351 05/12/19  0725   WBC 3.1*  --   --   --  2.3* 3.0*   HGB 7.9* 8.6*  --   --  6.6* 7.3*   HCT 24.2* 26.4*  --   --  20.4* 22.2*   PLT 61*  --   --   --  65* 69*   MCV 82.3  --   --   --  80.6 81.6     --   --  142  --  141   K 3.4*  --   --  3.4*  --  3.6   *  --   --  112*  --  112*   CO2 25  --   --  23  --  20*   BUN 13  --   --  13  --  15   CREA 0.64  --   --  0.58*  --  0.56*   CA 7.8*  --   --  7.2*  --  6.7*   *  --   --  132*  --  157*   *  --   --  287*  --  373*   SGOT 25  --   --  24  --  28   ALT 20  --   --  18  --  14   TBILI 1.1  --   --  1.0  --  0.8   ALB 1.8*  --   --  1.6*  --  1.7*   TP 5.5*  --   --  5.0*  --  5.1*   PTP  --   --  15.5*  --   --   --    INR  --   --  1.3  --   --   --       EGD 3/2/19, Dr. Tejeda Post: Findings:   OROPHARYNX: The oropharynx was briefly viewed on entry and withdrawal with very brief evaluation of the cords, arytenoids and pyriform sinuses. No abnormalities found.     ESOPHAGUS:  The esophagus was normal with an intact squamocolumnar z-line without erosions or evidence of Layton's intestinal metaplasia. The mucosa was normal.  There were no strictures, rings or noticeable narrowing of the lumen. The endoscope was easily passed into the gastric pouch and there was no apparent hiatal hernia.   STOMACH: The gastric pouch inflated and deflated normally. The mucosal surface and gastric rugae were normal without erosions, ulcerations, raised lesions, vascular ectasias or other anomalies. The pylorus was patent to passage of the endoscope without difficulty.     DUODENUM:  The endoscope was easily passed into the third section of the duodenum. The villous mucosa was normal without blunting or scalloped folds. There were no mucosal erosions, ulcerations, raised lesions or vascular ectasias. COLONOSCOPY 3/2/19, Dr. Javed Connor: Findings:   TERMINAL ILEUM: The terminal ileum was entered and appeared normal with a normal villous mucosa. COLON:  The colonic mucosa from the cecum to the rectum was carefully examined. The mucosa appeared normal with normal vascularity except for a few AVM's in the mid transverse. There was no diverticulosis, inflammatory changes, mucosal polyps, raised lesions or abnormal pigmentation. ANUS/RECTUM: Anal exam reveals no masses or hemorrhoids, sphincter tone is normal. Rectal exam reveals no masses or hemorrhoids. Direct and retroflexed views of the rectum were normal except for 1+ IH's.      CT 2/27/19: FINDINGS:   Abdomen: The lung bases are clear. The spleen is clearly enlarged, 16.8 cm  (normal usually less than 14). The liver appears homogeneous and has a macronodular contour suggesting cirrhosis. No focal liver lesion. No calcified gallstones. The biliary tree is not dilated. The pancreas is unremarkable. No free fluid, acute inflammatory changes or adenopathy. Bowel loops are not dilated. The kidneys enhance  uniformly. No radiopaque renal calculi. No hydronephrosis. The adrenal glands are normal size.  Aorta is normal caliber.     Pelvis: No free fluid or acute inflammatory changes. The urinary bladderunremarkable. No gross bony lesions. IMPRESSION:  Hepatic cirrhosis and splenomegaly.     Ultrasound abdomen complete 5/13/19: FINDINGS:   Pancreas is grossly unremarkable although the tail is partially  obscured by overlying bowel gas. . Aorta is normal caliber, 2.0 cm. The IVC is patent. The enlarged sized spleen has a homogenous echotexture and measures 19 cm. Left kidney is unremarkable without hydronephrosis and measures 12.8 cm. Right kidney is unremarkable without hydronephrosis and measures also 12 point cm. Renal echogenicity appears mildly increased, the right kidney is mildly hyperechoic to the liver. The intrahepatic biliary tree is not dilated. There is  hepatopetal flow in the portal vein. No gross focal parenchymal abnormalityidentified within the liver. The liver has a coarsened echotexture. There is  moderate ascites in the upper abdomen. The gallbladder wall is not thickened. There is layering echogenic bile (sludge)without discrete shadowing gallstones. The common bile duct is not dilated, 3 4 mm. IMPRESSION: Coarsened liver echotexture with ascites and splenomegaly, probably cirrhosis and portal venous hypertension. Gallbladder sludge without biliary tree obstruction. Assessment:     Principal Problem:    Cellulitis of right foot (5/7/2019)    Active Problems:    Hyperlipidemia (1/12/2016)      Essential hypertension (1/12/2016)      Acquired hypothyroidism (1/12/2016)      Type 2 diabetes with nephropathy ([address] (1/16/2019)      Microcytic anemia (2/27/2019)      Cirrhosis of liver without ascites ([address] (3/7/2019)    57 y.o. female with PMH of a 12+ year history of diabetes, HTN, and recent diagnosis of Cirrhosis in Feb 2019, who was admitted for severe cellulitis of the right foot requiring surgical debridement 5/13/19 and who is seen in consultation at the request of Dillon Joya NP for cirrhosis with new ascites.  Patient was seen in consultation in Feb for new Cirrhosis with JENNY and thrombocytopenia. Liver serology to include ceruloplasmin, KRISTEN, AMA, ASMA, and hepatitis screen were negative. She had JENNY and EGD\colonoscopy without significant findings(above). Since this admission, pt reports a nearly 20lb weight gain with lower extremity edema and increasing abdominal girth which she thinks is related to volume overload. Ultrasound  5/13 showed cirrhotic liver and changes and upper abdominal ascites. Her hgb trended down and she received one unit of PRBC 5/13 but she denies overt bleeding. She has had intermittent nausea and diarrhea worse in the last 24 hours but Cdiff was negative. She denies abdominal pain and feels that her abdominal distention is much improved since she started fluid restriction and diuretics (lasix 40 IV q12, aldactone 25mg daily). She has requested daily weights. INR this admission was 1.3 but she has significant pancytopenia (WBC 3.1, hgb 7.9 post transfusion, MCV 82, Plt 61k). Mild hypokalemia is noted. LFTs appear to be stable but albumin is 1.8. She has not yet followed up with our office as an outpatient. Plan:     - continue daily weights and start 2g Na diet. - continue diuresis. Will increase aldactone to 50mg daily as of tomorrow. If ascites persists, would increase to 100mg daily   - monitor electrolytes and replace prn.   - monitor pancytopenia and transfuse prn. Would ask nurse to report any hematochezia, melena, or other overt bleeding. [place], [place]        Patient is seen and examined in collaboration with Dr. Katja Richardson. Assessment and plan as per Dr. Katja Richardson. normal

## 2022-08-30 NOTE — ED PROVIDER NOTE - ATTENDING CONTRIBUTION TO CARE
Attending Statement (JYOTSNA Rosen MD):    HPI: 53y/o F with h/o HTN, prior PE (2019; provoked after hysterectomy) who presents by EMS for evaluation of high blood pressure and feeling lightheaded.  STates that she began having a headache and feeling lightheaded last night (headache is similar to prior HA she has had in past, gradual onset, back of neck radiating to top of head, worsening with head movement but still able to move head/neck; no associated weakness/numbness/vision changes); states checked her BP last night and it was high, and this morning HA had resolved but still felt a little lightheaded (dizzy/ no passing out, no room-spinning) and has no pain; this AM she states her BP was still elevated, prompting her to call 911 to come to hospital. Further, tete reports a history of HTN, for which she has been prescribed amlodipine, but self-discontinued 1 month ago as she didn't feel she needs it (states only gets hypertensive when she feels headaches or burning sensation in ears - has neither at present). Denies chest pain, denies SOB, denies palpitations. Lightheadedness not associated with exertion/activity.  Denies falls/head trauma.  No leg swelling, no change in urination.    PSH: hysterectomy (2/2 uterine bleeding from fibroids; per patient)    Review of Systems:  -General: no fever or chills  -ENT: no congestion, no difficulty swallowing  -Pulmonary: +cough (chronic, nonproductive, unclear duration months-years?), no shortness of breath  -Cardiac: no chest pain, no palpitations  -Gastrointestinal: no abdominal pain, no nausea, no vomiting, and no diarrhea.  -Genitourinary: no blood or pain with urination  -Musculoskeletal: no back or neck pain  -Skin: no rashes  -Endocrine: No h/o diabetes  -Neurologic: No new weakness or numbness in extremities    All else negative unless otherwise specified elsewhere in this note.    PSH/PMH as noted above    On Physical Exam:  General: well appearing, in NAD, speaking clearly in full sentences and without difficulty; cooperative with exam  HEENT: PERRL, MMM  Neck: no neck tenderness, no nuchal rigidity  Cardiac: normal s1, s2; RRR; no MGR  Lungs: CTABL  Abdomen: soft nontender/nondistended  : no bladder tenderness or distension  Skin: intact, no rash  Extremities: no peripheral edema, no gross deformities  Neuro: no gross neurologic deficits; CN II-XII grossly intact, moving all extremities equally    ECG: NSR @87bpm, no st elevations or depressions; no twave inversions; qtc 462    MDM: 53y/o F with h/o HTN, poorly controlled (likely 2/2 med noncompliance) and vague lightheadedness with no hypotension, no fever and no focal neurologic deficits; no cp/sob to suggest cardiac etiology. Patient reports being very concerned about a recurrent PE (no recent surgery or immobilization, no other risk factors for PE identified except for prior PE which was provoked; and has no CP, SOB, leg swelling, palpitations or SHIN - no symptoms of a PE; CTA not recommended given no symptoms of PE).  Will check screening labs given uncontrolled HTN: cbc (to evaluate for leukocytosis or anemia), CMP (to evaluate for electrolyte abnormalities or renal/liver dysfunction). Screening CXR. Restart amlodipine, and reassess after labs for discharge.

## 2022-08-30 NOTE — ED PROVIDER NOTE - PROGRESS NOTE DETAILS
Damien Coburn MD (PGY2): Lab work, cxr and ekg non-actionable. Patient's SBP 160s and DBP in low 100s. Given Amlodipine. Patient initially stated that her Amlodipine dose is 10mg; however, late stated it is 20mg. Also gave lopressor 5mg. DBP ~105  at this time. This is where her BP always his. Spoke in depth about BP control including taking her prescribed dose and no less/more. Heavily encouraged quick PMD f/u. BP at home journal. Anticipatory guidance. Educated about signs and symptoms to watch out for: chest pain, palpitations, n/v, vision changes, headache, and stroke-like sx. Strict return precautions. Damien Coburn MD (PGY2): Lab work, cxr and ekg non-actionable. Patient's SBP 160s and DBP in low 100s. Given Amlodipine. Patient initially stated that her Amlodipine dose is 10mg; however, late stated it is 20mg. Also gave lopressor 5mg. DBP ~105  at this time. This is where her BP always his. Spoke in depth about BP control including taking her prescribed dose and no less/more. Heavily encouraged quick PMD f/u. BP at home journal. Anticipatory guidance. Educated about signs and symptoms to watch out for: chest pain, palpitations, n/v, vision changes, headache, and stroke-like sx. Strict return precautions.    Yakelin Villagran MD Attending Physician- patient signed out to me. agree with above

## 2022-08-30 NOTE — ED PROVIDER NOTE - NSFOLLOWUPCLINICS_GEN_ALL_ED_FT
[facility] General Internal Medicine  General Internal Medicine  [address] [place] [address]  Phone: [phone]  Fax:

## 2022-08-30 NOTE — ED PROVIDER NOTE - PATIENT PORTAL LINK FT
You can access the FollowMyHealth Patient Portal offered by [facility] by registering at the following website: [email or web address]. By joining [facility]’s FollowMyHealth portal, you will also be able to view your health information using other applications (apps) compatible with our system.

## 2022-08-30 NOTE — ED PROVIDER NOTE - NS ED ROS FT
CONSTITUTIONAL: No fevers, no chills, no lightheadedness, no dizziness  EYES: no visual changes, no eye pain  EARS: no ear drainage, no ear pain, no change in hearing  NOSE: no nasal congestion  MOUTH/THROAT: no sore throat  CV: No chest pain, no palpitations  RESP: No SOB, + cough  GI: No n/v/d, no abd pain  : no dysuria, no hematuria, no flank pain  MSK: no back pain, no extremity pain  SKIN: no rashes  NEURO: + headache, no focal weakness, no decreased sensation/parasthesias   PSYCHIATRIC: no known mental health issues

## 2022-08-30 NOTE — ED PROVIDER NOTE - CLINICAL SUMMARY MEDICAL DECISION MAKING FREE TEXT BOX
53 y/o F p/w elevated bp and dry cough. Has been off bp med, no outpatient f/u. Vitals stable. Exam as above. Will get basic, ekg and cxr. will give home BP med. Will reassess vitals. Patient not tachycardic or hypoxic here. Prev PE after sx. No red flag sx at this time. Low c/f pe. dispo- pending  labs and imaging.

## 2022-08-30 NOTE — ED PROVIDER NOTE - OBJECTIVE STATEMENT
Patient is a 51 y/o F with PMHx sig for HTN and Patient is a 53 y/o F with PMHx sig for HTN and PE after hysterectomy (no longer on AC) p/w elevated BP Patient is a 51 y/o F with PMHx sig for HTN and PE after hysterectomy (no longer on AC) p/w elevated BP. Stopped her Patient is a 51 y/o F with PMHx sig for HTN and PE after hysterectomy (no longer on AC) p/w elevated BP. Stopped her Amlodipine 10mg qdaily 1 month ago because "she felt good". Has not followed with her PMD in >6mos. Home BP reading and Patient is a 51 y/o F with PMHx sig for HTN and PE after hysterectomy (no longer on AC) p/w elevated BP. Stopped her Amlodipine 10mg qdaily 1 month ago because "she felt good". Has not followed with her PMD in >6mos. Home BP reading and BP at  ~160S. Has HA last night, gloab, gradual onset, no photophoboa, phonophobia, vision changes, n/v. HA subsided. Had similar HA when she had elevated BP. Today, felt lightheaded and went to . Also c/o dry cough, no chest pain, palpatations, syncope, sob,  sick contacts, travel, other uri sx. No hormone use. Patient is a 53 y/o F with PMHx sig for HTN and PE after hysterectomy (no longer on AC) p/w elevated BP. Stopped her Amlodipine 10mg qdaily 1 month ago because "she felt good". Has not followed with her PMD in >6mos. Home BP reading and BP at  ~160S. Has HA last night, global gradual onset, no photophobia, phonophobia, vision changes, n/v. HA subsided. Had similar HA when she had elevated BP. Today, felt lightheaded and went to . Also c/o dry cough, no chest pain, palpitations, syncope, sob,  sick contacts, travel, other uri sx. No hormone use.

## 2022-08-30 NOTE — ED PROVIDER NOTE - NSFOLLOWUPINSTRUCTIONS_ED_ALL_ED_FT
You were seen in the Emergency Department for high blood pressure. Lab and imaging results, if performed, were discussed with you along with your discharge diagnosis.    Please follow with your PMD to discuss blood pressure management. Keep a log of your blood pressures. Follow up with your doctor in 1 week - bring copies of your results if you were given. If you do not have a primary doctor, please call [phone] to find one convenient for you.    Continue your prescription of Amlodipine 10mg once a day. Do not miss a dose.     To control your pain at home, you should take Ibuprofen 400 mg along with Tylenol 650mg-1000mg every 6 to 8 hours. Limit your maximum daily Tylenol from all sources to 4000mg. Be aware that many other medications contain acetaminophen which is also known as Tylenol. Taking Tylenol and Ibuprofen together has been shown to be more effective at relieving pain than taking them separately. These are both over the counter medications that you can  at your local pharmacy without a prescription. You need to respect all of the warnings on the bottles. You shouldn’t take these medications for more than a week without following up with your doctor. Both medications come with certain risks and side effects that you need to discuss with your doctor, especially if you are taking them for a prolonged period.    Return to ED for any new or worsening symptoms including but not limited to: development of chest pain, shortness of breath, fever, vomiting, focal numbness, weakness or tingling, any severe CP, headache, abdominal pain, back pain.      Rest and keep yourself hydrated with fluids. You were seen in the Emergency Department for high blood pressure. Lab and imaging results, if performed, were discussed with you along with your discharge diagnosis.    Please follow with your PMD to discuss blood pressure management. Keep a log of your blood pressures. Follow up with your doctor in 1 week - bring copies of your results if you were given. If you do not have a primary doctor, please call [phone] to find one convenient for you.    Continue your PRESCRIBED dose of Amlodipine.  Do not miss a dose.     To control your pain at home, you should take Ibuprofen 400 mg along with Tylenol 650mg-1000mg every 6 to 8 hours. Limit your maximum daily Tylenol from all sources to 4000mg. Be aware that many other medications contain acetaminophen which is also known as Tylenol. Taking Tylenol and Ibuprofen together has been shown to be more effective at relieving pain than taking them separately. These are both over the counter medications that you can  at your local pharmacy without a prescription. You need to respect all of the warnings on the bottles. You shouldn’t take these medications for more than a week without following up with your doctor. Both medications come with certain risks and side effects that you need to discuss with your doctor, especially if you are taking them for a prolonged period.    Return to ED for any new or worsening symptoms including but not limited to: development of vision loss, dizziness, chest pain, shortness of breath, fever, vomiting, focal numbness, weakness or tingling, any severe CP, headache, abdominal pain, back pain.      Rest and keep yourself hydrated with fluids.        High blood pressure (hypertension) is when the force of blood pumping through the arteries is too strong. The arteries are the blood vessels that carry blood from the heart throughout the body. Hypertension forces the heart to work harder to pump blood and may cause arteries to become narrow or stiff. Untreated or uncontrolled hypertension can cause a heart attack, heart failure, a stroke, kidney disease, and other problems.    A blood pressure reading consists of a higher number over a lower number. Ideally, your blood pressure should be below 120/80. The first ("top") number is called the systolic pressure. It is a measure of the pressure in your arteries as your heart beats. The second ("bottom") number is called the diastolic pressure. It is a measure of the pressure in your arteries as the heart relaxes.    What are the causes?  The exact cause of this condition is not known. There are some conditions that result in or are related to high blood pressure.    What increases the risk?  Some risk factors for high blood pressure are under your control. The following factors may make you more likely to develop this condition:    Smoking.  Having type 2 diabetes mellitus, high cholesterol, or both.  Not getting enough exercise or physical activity.  Being overweight.  Having too much fat, sugar, calories, or salt (sodium) in your diet.  Drinking too much alcohol.    Some risk factors for high blood pressure may be difficult or impossible to change. Some of these factors include:    Having chronic kidney disease.  Having a family history of high blood pressure.  Age. Risk increases with age.  Race. You may be at higher risk if you are .  Gender. Men are at higher risk than women before age 45. After age 65, women are at higher risk than men.  Having obstructive sleep apnea.  Stress.    What are the signs or symptoms?  High blood pressure may not cause symptoms. Very high blood pressure (hypertensive crisis) may cause:    Headache.  Anxiety.  Shortness of breath.  Nosebleed.  Nausea and vomiting.  Vision changes.  Severe chest pain.  Seizures.    How is this diagnosed?  This condition is diagnosed by measuring your blood pressure while you are seated, with your arm resting on a flat surface, your legs uncrossed, and your feet flat on the floor. The cuff of the blood pressure monitor will be placed directly against the skin of your upper arm at the level of your heart. It should be measured at least twice using the same arm. Certain conditions can cause a difference in blood pressure between your right and left arms.    Certain factors can cause blood pressure readings to be lower or higher than normal for a short period of time:    When your blood pressure is higher when you are in a health care provider's office than when you are at home, this is called white coat hypertension. Most people with this condition do not need medicines.  When your blood pressure is higher at home than when you are in a health care provider's office, this is called masked hypertension. Most people with this condition may need medicines to control blood pressure.    If you have a high blood pressure reading during one visit or you have normal blood pressure with other risk factors, you may be asked to:    Return on a different day to have your blood pressure checked again.  Monitor your blood pressure at home for 1 week or longer.    If you are diagnosed with hypertension, you may have other blood or imaging tests to help your health care provider understand your overall risk for other conditions.    How is this treated?  This condition is treated by making healthy lifestyle changes, such as eating healthy foods, exercising more, and reducing your alcohol intake. Your health care provider may prescribe medicine if lifestyle changes are not enough to get your blood pressure under control, and if:    Your systolic blood pressure is above 130.  Your diastolic blood pressure is above 80.    Your personal target blood pressure may vary depending on your medical conditions, your age, and other factors.    Follow these instructions at home:      Eating and drinking     Eat a diet that is high in fiber and potassium, and low in sodium, added sugar, and fat. An example eating plan is called the DASH (Dietary Approaches to Stop Hypertension) diet. To eat this way:    Eat plenty of fresh fruits and vegetables. Try to fill one half of your plate at each meal with fruits and vegetables.  Eat whole grains, such as whole-wheat pasta, brown rice, or whole-grain bread. Fill about one fourth of your plate with whole grains.  Eat or drink low-fat dairy products, such as skim milk or low-fat yogurt.  Avoid fatty cuts of meat, processed or cured meats, and poultry with skin. Fill about one fourth of your plate with lean proteins, such as fish, chicken without skin, beans, eggs, or tofu.  Avoid pre-made and processed foods. These tend to be higher in sodium, added sugar, and fat.  Reduce your daily sodium intake. Most people with hypertension should eat less than 1,500 mg of sodium a day.  Do not drink alcohol if:    Your health care provider tells you not to drink.  You are pregnant, may be pregnant, or are planning to become pregnant.  If you drink alcohol:    Limit how much you use to:    0–1 drink a day for women.  0–2 drinks a day for men.  Be aware of how much alcohol is in your drink. In the [place] one drink equals one 12 oz bottle of beer (355 mL), one 5 oz glass of wine (148 mL), or one 1½ oz glass of hard liquor (44 mL).        Lifestyle     Work with your health care provider to maintain a healthy body weight or to lose weight. Ask what an ideal weight is for you.  Get at least 30 minutes of exercise most days of the week. Activities may include walking, swimming, or biking.  Include exercise to strengthen your muscles (resistance exercise), such as Pilates or lifting weights, as part of your weekly exercise routine. Try to do these types of exercises for 30 minutes at least 3 days a week.  Do not use any products that contain nicotine or tobacco, such as cigarettes, e-cigarettes, and chewing tobacco. If you need help quitting, ask your health care provider.  Monitor your blood pressure at home as told by your health care provider.  Keep all follow-up visits as told by your health care provider. This is important.        Medicines    Take over-the-counter and prescription medicines only as told by your health care provider. Follow directions carefully. Blood pressure medicines must be taken as prescribed.  Do not skip doses of blood pressure medicine. Doing this puts you at risk for problems and can make the medicine less effective.  Ask your health care provider about side effects or reactions to medicines that you should watch for.    Contact a health care provider if you:  Think you are having a reaction to a medicine you are taking.  Have headaches that keep coming back (recurring).  Feel dizzy.  Have swelling in your ankles.  Have trouble with your vision.    Get help right away if you:  Develop a severe headache or confusion.  Have unusual weakness or numbness.  Feel faint.  Have severe pain in your chest or abdomen.  Vomit repeatedly.  Have trouble breathing.    Summary  Hypertension is when the force of blood pumping through your arteries is too strong. If this condition is not controlled, it may put you at risk for serious complications.  Your personal target blood pressure may vary depending on your medical conditions, your age, and other factors. For most people, a normal blood pressure is less than 120/80.  Hypertension is treated with lifestyle changes, medicines, or a combination of both. Lifestyle changes include losing weight, eating a healthy, low-sodium diet, exercising more, and limiting alcohol.    ADDITIONAL NOTES AND INSTRUCTIONS    Please follow up with your Primary MD in 24-48 hr.  Seek immediate medical care for any new/worsening signs or symptoms.

## 2022-08-30 NOTE — ED ADULT NURSE NOTE - OBJECTIVE STATEMENT
52F PMHx HTN, PE in 2019, hysterectomy, liposuction presents to the ED with HTN and lightheadedness. sent from urgent care for further evaluation , denies pain, sob. h/a yesterday that subsided

## 2024-02-22 ENCOUNTER — APPOINTMENT (OUTPATIENT)
Dept: GASTROENTEROLOGY | Facility: CLINIC | Age: 54
End: 2024-02-22
Payer: COMMERCIAL

## 2024-02-22 VITALS
OXYGEN SATURATION: 95 % | HEIGHT: 66 IN | HEART RATE: 114 BPM | WEIGHT: 177 LBS | BODY MASS INDEX: 28.45 KG/M2 | DIASTOLIC BLOOD PRESSURE: 89 MMHG | TEMPERATURE: 98.2 F | SYSTOLIC BLOOD PRESSURE: 132 MMHG

## 2024-02-22 DIAGNOSIS — I10 ESSENTIAL (PRIMARY) HYPERTENSION: ICD-10-CM

## 2024-02-22 DIAGNOSIS — Z12.11 ENCOUNTER FOR SCREENING FOR MALIGNANT NEOPLASM OF COLON: ICD-10-CM

## 2024-02-22 DIAGNOSIS — K62.5 HEMORRHAGE OF ANUS AND RECTUM: ICD-10-CM

## 2024-02-22 DIAGNOSIS — Z12.12 ENCOUNTER FOR SCREENING FOR MALIGNANT NEOPLASM OF COLON: ICD-10-CM

## 2024-02-22 PROCEDURE — 99204 OFFICE O/P NEW MOD 45 MIN: CPT

## 2024-02-22 RX ORDER — GLUC/MSM/COLGN2/HYAL/ANTIARTH3 375-375-20
TABLET ORAL
Refills: 0 | Status: DISCONTINUED | COMMUNITY
End: 2024-02-22

## 2024-02-22 RX ORDER — APIXABAN 5 MG/1
5 TABLET, FILM COATED ORAL
Qty: 60 | Refills: 2 | Status: DISCONTINUED | COMMUNITY
Start: 2019-08-14 | End: 2024-02-22

## 2024-02-22 RX ORDER — APIXABAN 5 MG/1
5 TABLET, FILM COATED ORAL
Refills: 0 | Status: DISCONTINUED | COMMUNITY
End: 2024-02-22

## 2024-02-22 RX ORDER — POLYETHYLENE GLYCOL 3350, SODIUM CHLORIDE, SODIUM BICARBONATE AND POTASSIUM CHLORIDE WITH LEMON FLAVOR 420; 11.2; 5.72; 1.48 G/4L; G/4L; G/4L; G/4L
420 POWDER, FOR SOLUTION ORAL
Qty: 1 | Refills: 0 | Status: ACTIVE | COMMUNITY
Start: 2024-02-22 | End: 1900-01-01

## 2024-02-22 NOTE — PHYSICAL EXAM
[Alert] : alert [Normal Voice/Communication] : normal voice/communication [No Acute Distress] : no acute distress [Healthy Appearing] : healthy appearing [Sclera] : the sclera and conjunctiva were normal [Hearing Threshold Finger Rub Not [place]] : hearing was normal [Normal Lips/Gums] : the lips and gums were normal [Oropharynx] : the oropharynx was normal [No Neck Mass] : no neck mass was observed [Normal Appearance] : the appearance of the neck was normal [No Respiratory Distress] : no respiratory distress [No Acc Muscle Use] : no accessory muscle use [Respiration, Rhythm And Depth] : normal respiratory rhythm and effort [Auscultation Breath Sounds / Voice Sounds] : lungs were clear to auscultation bilaterally [Heart Rate And Rhythm] : heart rate was normal and rhythm regular [Murmurs] : no murmurs [Normal S1, S2] : normal S1 and S2 [None] : no edema [Bowel Sounds] : normal bowel sounds [Abdomen Tenderness] : non-tender [No Masses] : no abdominal mass palpated [Abdomen Soft] : soft [] : no hepatosplenomegaly [Cervical Lymph Nodes Enlarged Posterior Bilaterally] : no posterior cervical lymphadenopathy [Supraclavicular Lymph Nodes Enlarged Bilaterally] : no supraclavicular lymphadenopathy [No CVA Tenderness] : no CVA  tenderness [Abnormal Walk] : normal gait [Motor Exam] : the motor exam was normal [Oriented To Time, Place, And Person] : oriented to person, place, and time [Normal] : oriented to person, place, and time [Normal Affect] : the affect was normal [Normal Mood] : the mood was normal

## 2024-02-22 NOTE — ASSESSMENT
[FreeTextEntry1] : Screening colonoscopy.  The importance of colon cancer screening and the colonoscopy prep was discussed with the patient.  She understands and all questions were answered. Status post pulmonary embolism.  Pulmonary clearance requested.

## 2024-02-22 NOTE — HISTORY OF PRESENT ILLNESS
[FreeTextEntry1] : 53-year-old woman with treated hypertension presents for a screening colonoscopy.  This will be her first colonoscopy.  She denies melena or hematemesis.  She has occasional rectal bleeding that she attributes to hemorrhoids.  She had a history of pulmonary embolus following a hysterectomy in 2019.  She is currently not on any anticoagulation but was due for follow-up with pulmonary medicine.  Her bowel movements are regular.  She denies rectal bleeding, melena or hematemesis.

## 2024-03-14 ENCOUNTER — APPOINTMENT (OUTPATIENT)
Dept: PULMONOLOGY | Facility: CLINIC | Age: 54
End: 2024-03-14
Payer: COMMERCIAL

## 2024-03-14 VITALS
HEART RATE: 88 BPM | OXYGEN SATURATION: 98 % | HEIGHT: 64 IN | RESPIRATION RATE: 18 BRPM | WEIGHT: 178 LBS | SYSTOLIC BLOOD PRESSURE: 132 MMHG | DIASTOLIC BLOOD PRESSURE: 100 MMHG | BODY MASS INDEX: 30.39 KG/M2

## 2024-03-14 DIAGNOSIS — I26.99 OTHER PULMONARY EMBOLISM W/OUT ACUTE COR PULMONALE: ICD-10-CM

## 2024-03-14 PROCEDURE — 94729 DIFFUSING CAPACITY: CPT

## 2024-03-14 PROCEDURE — ZZZZZ: CPT

## 2024-03-14 PROCEDURE — 94010 BREATHING CAPACITY TEST: CPT

## 2024-03-14 PROCEDURE — 94727 GAS DIL/WSHOT DETER LNG VOL: CPT

## 2024-03-14 PROCEDURE — 99204 OFFICE O/P NEW MOD 45 MIN: CPT | Mod: 25

## 2024-03-14 RX ORDER — DILTIAZEM HYDROCHLORIDE 60 MG/1
TABLET, COATED ORAL
Refills: 0 | Status: ACTIVE | COMMUNITY

## 2024-03-14 NOTE — HISTORY OF PRESENT ILLNESS
[Never] : never [TextBox_4] : 53F hx of PE after hysterectomy in 2019 (no longer on ac), HTN here for pulmonary clearance prior to colonscopy.  She reports feeling well, denies sob/cough/wheeze.  No hx of asthma/copd, never smoker.  She did not follow through with repeat CTA in 2022 when it was ordered.

## 2024-03-14 NOTE — PROCEDURE
[FreeTextEntry1] : PFT: Normal spirometry.  Lung volumes normal. DLCO normal.  Prior exams reviewed:  7/29/19 CTA: IMPRESSION:   Acute bilateral pulmonary emboli in bilateral upper lobes, right middle lobe  and bilateral lower lobes. Nonenhancing right lower lobe airspace  consolidation concerning for pulmonary infarct. Partial bilateral lower lobe  atelectasis. No CT evidence of right heart strain, however correlate with  echocardiography.   TTE 7/30/19: normal MV, minimal MR, grosslyl normal LV systolic function, normal RV size and function, inadequate TR to estimate RVSP  PFT: Normal spirometry without a significant bronchodilator response.  Lung volumes normal with evidence of air trapping. DLCO normal.

## 2024-03-14 NOTE — ASSESSMENT
[FreeTextEntry1] : obtain updated CTA now as we had originally planned in 2022.     pulmonary clearance for colonoscopy pending review of cta.  will call her with results.  A total of 45 minutes was spent on this encounter including history taking, chart review, physical examination, testing interpretation and treatment plan.

## 2024-05-19 ENCOUNTER — NON-APPOINTMENT (OUTPATIENT)
Age: 54
End: 2024-05-19

## 2024-05-20 ENCOUNTER — RESULT REVIEW (OUTPATIENT)
Age: 54
End: 2024-05-20

## 2024-05-20 ENCOUNTER — OUTPATIENT (OUTPATIENT)
Dept: OUTPATIENT SERVICES | Facility: HOSPITAL | Age: 54
LOS: 1 days | End: 2024-05-20
Payer: COMMERCIAL

## 2024-05-20 ENCOUNTER — APPOINTMENT (OUTPATIENT)
Dept: CT IMAGING | Facility: CLINIC | Age: 54
End: 2024-05-20
Payer: COMMERCIAL

## 2024-05-20 DIAGNOSIS — Z00.8 ENCOUNTER FOR OTHER GENERAL EXAMINATION: ICD-10-CM

## 2024-05-20 DIAGNOSIS — Z98.890 OTHER SPECIFIED POSTPROCEDURAL STATES: Chronic | ICD-10-CM

## 2024-05-20 PROCEDURE — 71275 CT ANGIOGRAPHY CHEST: CPT | Mod: 26

## 2024-05-20 PROCEDURE — 71275 CT ANGIOGRAPHY CHEST: CPT

## 2024-12-13 ENCOUNTER — APPOINTMENT (OUTPATIENT)
Dept: GASTROENTEROLOGY | Facility: AMBULATORY MEDICAL SERVICES | Age: 54
End: 2024-12-13